# Patient Record
Sex: FEMALE | Race: BLACK OR AFRICAN AMERICAN | Employment: UNEMPLOYED | URBAN - METROPOLITAN AREA
[De-identification: names, ages, dates, MRNs, and addresses within clinical notes are randomized per-mention and may not be internally consistent; named-entity substitution may affect disease eponyms.]

---

## 2018-01-15 PROBLEM — F41.8 DEPRESSION WITH ANXIETY: Status: ACTIVE | Noted: 2018-01-15

## 2018-01-15 PROBLEM — Z82.49 FAMILY HISTORY OF HEART DISEASE: Status: ACTIVE | Noted: 2018-01-15

## 2019-08-28 PROBLEM — M51.36 DDD (DEGENERATIVE DISC DISEASE), LUMBAR: Status: ACTIVE | Noted: 2019-08-28

## 2019-08-28 PROBLEM — K21.9 GERD (GASTROESOPHAGEAL REFLUX DISEASE): Status: ACTIVE | Noted: 2019-08-28

## 2019-08-28 PROBLEM — M48.062 SPINAL STENOSIS OF LUMBAR REGION WITH NEUROGENIC CLAUDICATION: Status: ACTIVE | Noted: 2019-08-28

## 2019-10-18 PROBLEM — E55.9 VITAMIN D DEFICIENCY: Status: ACTIVE | Noted: 2019-01-25

## 2019-11-07 PROBLEM — M48.061 LUMBAR STENOSIS: Status: ACTIVE | Noted: 2019-11-07

## 2020-02-17 PROBLEM — M85.80 OSTEOPENIA: Status: ACTIVE | Noted: 2019-11-06

## 2020-02-17 PROBLEM — K44.9 HIATAL HERNIA: Status: ACTIVE | Noted: 2020-02-17

## 2020-02-17 PROBLEM — E66.9 OBESITY: Status: ACTIVE | Noted: 2020-01-20

## 2020-02-17 PROBLEM — K31.84 GASTROPARESIS: Status: ACTIVE | Noted: 2020-02-17

## 2020-06-29 PROBLEM — R79.89 ELEVATED LIVER FUNCTION TESTS: Status: ACTIVE | Noted: 2020-06-29

## 2021-03-11 PROBLEM — E61.1 IRON DEFICIENCY: Status: ACTIVE | Noted: 2021-03-11

## 2021-03-22 ENCOUNTER — TELEPHONE (OUTPATIENT)
Dept: INTERNAL MEDICINE CLINIC | Age: 56
End: 2021-03-22

## 2021-03-22 NOTE — TELEPHONE ENCOUNTER
Please call patient to triage -- she went to [facility] today after eating an apple/pecan salad and her jaw got tight, then her lymph node swelled on right side. Was told it could be an allergic reaction or a clogged lymph node. Was given prednisone and another med and told to see her PCP. I had already scheduled her with Farnaz Anna tomorrow morning.

## 2021-03-23 NOTE — TELEPHONE ENCOUNTER
Katie Skiff advised me yesterday to just let her see Magi Geo this morning since she had already been seen at [facility].

## 2021-07-15 PROBLEM — G47.00 INSOMNIA, UNSPECIFIED TYPE: Status: ACTIVE | Noted: 2021-07-15

## 2022-03-29 PROBLEM — Z85.3 HISTORY OF RIGHT BREAST CANCER: Status: ACTIVE | Noted: 2021-07-01

## 2022-03-29 PROBLEM — I49.9 IRREGULAR HEART BEAT: Status: ACTIVE | Noted: 2022-01-01

## 2022-03-29 PROBLEM — M48.061 LUMBAR STENOSIS: Status: RESOLVED | Noted: 2019-11-07 | Resolved: 2022-03-29

## 2022-04-06 PROBLEM — R73.9 HYPERGLYCEMIA: Status: ACTIVE | Noted: 2022-04-06

## 2022-04-06 PROBLEM — E78.00 HIGH CHOLESTEROL: Status: ACTIVE | Noted: 2022-04-06

## 2022-05-20 PROBLEM — I50.30 HEART FAILURE WITH PRESERVED LEFT VENTRICULAR FUNCTION (HFPEF) (HCC): Status: ACTIVE | Noted: 2022-05-20

## 2022-06-29 PROBLEM — C50.919 INVASIVE LOBULAR CARCINOMA OF BREAST IN FEMALE (HCC): Status: ACTIVE | Noted: 2022-06-29

## 2022-10-06 PROBLEM — C50.911 BREAST CANCER, RIGHT (HCC): Status: RESOLVED | Noted: 2022-06-29 | Resolved: 2022-10-06

## 2022-10-06 PROBLEM — C50.911 BREAST CANCER, RIGHT (HCC): Status: ACTIVE | Noted: 2022-06-29

## 2022-11-10 ENCOUNTER — CLINICAL SUPPORT (OUTPATIENT)
Dept: SURGERY | Age: 57
End: 2022-11-10

## 2022-11-10 DIAGNOSIS — E66.01 CLASS 2 SEVERE OBESITY WITH BODY MASS INDEX (BMI) OF 35 TO 39.9 WITH SERIOUS COMORBIDITY (HCC): Primary | ICD-10-CM

## 2022-11-10 NOTE — PROGRESS NOTES
[facility]  Metabolic Program Follow-up Nutrition Consult    Date: 11/10/2022   Physician: Francesco Nazario NP  Name: Christopher Marie  :  1965    Type of Plan: LCD  Weeks on Plan: 7 months  Virtual visit was completed through [employer]. ASSESSMENT:    Medications/Supplements:   Prior to Admission medications    Medication Sig Start Date End Date Taking? Authorizing Provider   amoxicillin (AMOXIL) 500 mg capsule TAKE 1 CAPSULE BY MOUTH THREE TIMES DAILY FOR 7 DAYS FOR DENTAL INFECTION 22   Provider, Historical   clindamycin (CLEOCIN) 300 mg capsule TAKE 1 CAPSULE BY MOUTH FOUR TIMES DAILY UNTIL ALL TAKEN 22   Provider, Historical   ergocalciferol (ERGOCALCIFEROL) 1,250 mcg (50,000 unit) capsule TAKE 1 CAPSULE BY MOUTH EVERY 7 DAYS FOR 90 DDAYS 22   Provider, Historical   gabapentin (NEURONTIN) 300 mg capsule  22   Provider, Historical   HYDROcodone-acetaminophen (NORCO) 5-325 mg per tablet Take 1 Tablet by mouth every six (6) hours as needed. Patient not taking: No sig reported 22   Provider, Historical   metFORMIN ER (GLUCOPHAGE XR) 500 mg tablet  22   Provider, Historical   tiZANidine (ZANAFLEX) 2 mg tablet Take 2 mg by mouth as needed. 22   Provider, Historical   CYANOCOBALAMIN, VITAMIN B-12, INJECTION by Injection route every three (3) months. Provider, Historical   cholecalciferol (VITAMIN D3) 25 mcg (1,000 unit) cap Take 1,000 Units by mouth daily. 7/15/22   Provider, Historical   busPIRone (BUSPAR) 5 mg tablet Take 5 mg by mouth two (2) times a day. 22   Provider, Historical   traMADol-acetaminophen (ULTRACET) 37.5-325 mg per tablet 1 Tablet as needed. Takes as needed. 22   Provider, Historical   LORazepam (ATIVAN) 0.5 mg tablet Take 0.5 mg by mouth nightly.  22   Provider, Historical   Insulin Needles, Disposable, (Betty Pen Needle) 32 gauge x \" ndle Use daily as directed 22   Dee GONZALES, DO   pregabalin (LYRICA) 150 mg capsule Take 150 mg by mouth daily. Provider, Historical   cyclobenzaprine (FLEXERIL) 10 mg tablet Take 10 mg by mouth as needed. 6/2/22   Provider, Historical   buPROPion XL (WELLBUTRIN XL) 150 mg tablet Take 1 Tablet by mouth daily. 4/28/22   Naman GONZALES DO   sodium fluoride-pot nitrate 1.1-5 % pste  10/21/21   Provider, Historical   bumetanide (BUMEX) 1 mg tablet Take 1 Tablet by mouth Three (3) times a week. Patient taking differently: Take 1 mg by mouth every Monday, Wednesday, Friday. 4/8/22   Mayra Thomas, STELLA   anastrozole (ARIMIDEX) 1 mg tablet Take 1 mg by mouth daily. Provider, Historical   potassium 99 mg tablet Take 1 Tablet by mouth daily. 9/30/21   Mayra Thomas, NP   nystatin-triamcinolone (MYCOLOG II) topical cream Apply  to affected area two (2) times a day. Provider, Historical   acetaminophen (TYLENOL ARTHRITIS PO) Take  by mouth two (2) times daily as needed. Provider, Historical   cyanocobalamin (VITAMIN B12) 500 mcg tablet Take 500 mcg by mouth every morning. Provider, Historical   sucralfate (CARAFATE) 1 gram tablet Take 1 g by mouth daily as needed. Prn     Provider, Historical   linaclotide (LINZESS) 290 mcg cap capsule Take 290 mcg by mouth daily as needed. Provider, Historical              Starting Weight: 198#   Current Weight: 199# (199# last visit one month ago)  Overall Pounds Lost: 0  Overall Pounds Gained: 1#  Self report weight is 194#    Exercise/Physical Activity:not reported    Is patient using [facility] Directions products:no  If yes, how many per day: none    Aversions/side effects of product/program:N/a    Fluids used to mix with products:n/a    Reported Diet History: only ND product using PRN is the fiber tea in the morning. Has d.c the shakes and bars. Pt is choosing grocery meals and snacks trying to remain within 6496-9646 calories per day.   May have a refined carb snack 1-2 days per week to stay on track overall for the whole week. This helps satisfy those cravings. Water to suppress hunger. Exceeds 60 ounces per day. Fiber tea in the morning from ND.      24 Hour Diet Recall  Breakfast 7am Spoonful of pb OR boiled or scrambled egg, raisin toast, apple sauce   Lunch 12pm Healthy choice frozen meal   Dinner 5-6pm 1 fried pork chop with corn and 1 T red potatoes   Snacks 8:30pm Raisin/walnut/cheese OR sour cream and onion chip small bowl 1-2 x week, OR carb one ice cream 1-2x week OR cottage cheese/peaches   Beverages       Barriers/concerns preventing patient from achieving goal(s) since last visit:cravings PRN    NUTRITION INTERVENTION:  Pt educated on nutrition recommendations for the [facility] Low Calorie Plan (LCD), with the specific meal pattern of 2 meal replacements every day plus a grocery meal and snack. Daily recommended totals: 1200 calories, 60 grams carbs, 80+ grams protein, and remaining calories as healthy fats. Use LCD handout for meal and snack suggestions and preparation. Grocery meal:  Use the balanced plate method to plan meals, include 3-6 oz of lean source of protein, unlimited non-starchy vegetables, 1/2 cup whole grains/beans OR 1/2 cup fruit OR 1 serving of low fat dairy. Utilize handouts listing healthy snack and meal ideas. Read all nutrition labels. Demonstrated and emphasized identifying serving size, total fat, sugar and protein content. Defined low fat as </= 3 g per serving. Discussed lean and extra lean sources of protein. Avoid foods with sugar listed in the first 3 ingredients and >/10 g sugar per serving. Consume meal replacements every three to four hours or pattern as discussed with provider. Mix with water. May add herbs/spices for taste. Practice mindful eating habits; take small bites, chew thoroughly, avoid distractions, utilize hunger/fullness scale. Reviewed attendance policy of attending weekly nutrition classes.   Metabolic support group recommended, and increase physical activity (approved per MD) for long term weight maintenance. NUTRITION MONITORING AND EVALUATION: pt no longer doing [facility] LCD plan, all grocery meals moving forward. Discussed basic nutrition guidelines, balanced plate, hydration, and increased movement. Followup PRN, but moving forward will be a [facility] patient. She plans to discuss program at next visit to decide how to proceed. Specific tips and techniques to facilitate compliance with above recommendations were provided and discussed. If further details are desired please contact me at [phone]. This phone number was also provided to the patient for any further questions or concerns.           Sylvie Rivera, MS, RD, LDN

## 2022-11-22 ENCOUNTER — OFFICE VISIT (OUTPATIENT)
Dept: SURGERY | Age: 57
End: 2022-11-22
Payer: MEDICARE

## 2022-11-22 VITALS
RESPIRATION RATE: 18 BRPM | SYSTOLIC BLOOD PRESSURE: 111 MMHG | OXYGEN SATURATION: 96 % | WEIGHT: 194.8 LBS | BODY MASS INDEX: 34.52 KG/M2 | TEMPERATURE: 98.1 F | HEART RATE: 65 BPM | DIASTOLIC BLOOD PRESSURE: 75 MMHG | HEIGHT: 63 IN

## 2022-11-22 DIAGNOSIS — Z99.89 OSA ON CPAP: ICD-10-CM

## 2022-11-22 DIAGNOSIS — E78.00 HIGH CHOLESTEROL: ICD-10-CM

## 2022-11-22 DIAGNOSIS — G47.33 OSA ON CPAP: ICD-10-CM

## 2022-11-22 DIAGNOSIS — E66.09 CLASS 1 OBESITY DUE TO EXCESS CALORIES WITH SERIOUS COMORBIDITY IN ADULT, UNSPECIFIED BMI: Primary | ICD-10-CM

## 2022-11-22 DIAGNOSIS — R73.9 HYPERGLYCEMIA: ICD-10-CM

## 2022-11-22 PROCEDURE — G9899 SCRN MAM PERF RSLTS DOC: HCPCS | Performed by: FAMILY MEDICINE

## 2022-11-22 PROCEDURE — G8754 DIAS BP LESS 90: HCPCS | Performed by: FAMILY MEDICINE

## 2022-11-22 PROCEDURE — G9717 DOC PT DX DEP/BP F/U NT REQ: HCPCS | Performed by: FAMILY MEDICINE

## 2022-11-22 PROCEDURE — G8427 DOCREV CUR MEDS BY ELIG CLIN: HCPCS | Performed by: FAMILY MEDICINE

## 2022-11-22 PROCEDURE — 99214 OFFICE O/P EST MOD 30 MIN: CPT | Performed by: FAMILY MEDICINE

## 2022-11-22 PROCEDURE — G8417 CALC BMI ABV UP PARAM F/U: HCPCS | Performed by: FAMILY MEDICINE

## 2022-11-22 PROCEDURE — 3017F COLORECTAL CA SCREEN DOC REV: CPT | Performed by: FAMILY MEDICINE

## 2022-11-22 PROCEDURE — G8752 SYS BP LESS 140: HCPCS | Performed by: FAMILY MEDICINE

## 2022-11-22 RX ORDER — SODIUM FLUORIDE 5 MG/ML
PASTE, DENTIFRICE DENTAL
COMMUNITY
Start: 2022-09-29

## 2022-11-22 NOTE — PROGRESS NOTES
[facility] Progress Note:   F/up Physician Visit    CC: Weight Management      Cheryl Will is a 61 y.o. female who is here for her  f/up physician visit for the LCD Program.  Post GBP  Lowest weight was 140  Now 194  Goal 170   Oct was 199    Taking wellbutrin xl 150 mg a day  She thinks the wellbutin stimultes hunger   She has an egg after the meal replacement  She has discussed this with the dietitian    Weight Metrics 11/22/2022 11/22/2022 10/6/2022 9/13/2022 9/13/2022 8/9/2022 8/9/2022   Weight - 194 lb 12.8 oz 199 lb - 199 lb 9.6 oz - 203 lb 3.2 oz   Neck Circ (inches) 12 - - 12.5 - 12.5 -   Waist Measure Inches 34.25 - - 36 - 36.5 -   Body Fat % 41 - - 41.7 - 42 -   BMI - 34.51 kg/m2 35.25 kg/m2 - 35.36 kg/m2 - 36 kg/m2         Outpatient Medications Marked as Taking for the 11/22/22 encounter (Office Visit) with Rose Mesa MD   Medication Sig Dispense Refill    gabapentin (NEURONTIN) 300 mg capsule Take 300 mg by mouth as needed. Participation   Did you attend clinic and class last week? no    Review of Systems  Since your last visit, have you experienced any complications? no  If yes, please list:       Are you taking an appetite suppressant? yes  If so, is there any Chest Pain, Palpitations or Dizziness? HUNGER CONTROL: fair    BP Readings from Last 3 Encounters:   11/22/22 111/75   10/06/22 110/71   09/13/22 113/79       SLEEP:6-7    Have you received any other medical care this week? no  If yes, where and for what? Have you discontinued or changed any medicine or dose of your medicine since your last visit with Dr Chao Gillis? no  If yes, where and for what?          Diet  How many ounces of calorie-free liquids did you consume each day?  57 oz    How many meal replacements did you take each day? 2 and a meal and a few snacks    Did you have any problems adhering to the program?  no If yes, please explain:      Exercise  Aerobic exercise: 45 min  4 days  Resistance exercise: at the gym on alternating days workouts / week  Any discomfort?  no     If yes, where? Objective  Visit Vitals  /75 (BP 1 Location: Right arm, BP Patient Position: Sitting, BP Cuff Size: Adult)   Pulse 65   Temp 98.1 °F (36.7 °C) (Oral)   Resp 18   Ht 5' 3\" (1.6 m)   Wt 194 lb 12.8 oz (88.4 kg)   SpO2 96%   BMI 34.51 kg/m²     No LMP recorded. Patient has had a hysterectomy. Physical Exam  Appearance: well,   Mental:A&O x 3, NAD  H:NC/AT,  EENT:   EOMI, PERRL, No scleral icterus  Neck: no bruit or JVD  Lung: clear, No W/R  ABD: soft, active, nontender  Ext:  no Edema  Neuro: nonfocal  Assessment / Plan    Encounter Diagnoses   Name Primary? Class 1 obesity due to excess calories with serious comorbidity in adult, unspecified BMI Yes    KEVIN on CPAP     Hyperglycemia     High cholesterol      Diagnoses and all orders for this visit:    1. Class 1 obesity due to excess calories with serious comorbidity in adult, unspecified BMI  Cont the LCD  Doing well with exercise  Sleeping pretty good too  Doing well  2. KEVIN on CPAP  Sleeping well  3. Hyperglycemia  Waiting for her toget labs drawn, orders are waiting  4. High cholesterol  Orders in system waiting for her to go to lab  1. Weight management improved   lost 5 lbs over the past month  Doing well     Progress was reviewed with patient    2. Labs    Latest results reviewed with patient       face to face time with Christen Munguia consisted of counseling & coordinating and/or discussing treatment plans in reference to her obesity The primary encounter diagnosis was Class 1 obesity due to excess calories with serious comorbidity in adult, unspecified BMI. Diagnoses of KEVIN on CPAP, Hyperglycemia, and High cholesterol were also pertinent to this visit.

## 2022-11-22 NOTE — PROGRESS NOTES
Weight Management. 1 month follow up. 1. Have you been to the ER, urgent care clinic since your last visit? Hospitalized since your last visit? No    2. Have you seen or consulted any other health care providers outside of the [address] [place] [address] since your last visit? Include any pap smears or colon screening.  Yes, [facility].    BMI - 34.1

## 2022-11-29 ENCOUNTER — NURSE TRIAGE (OUTPATIENT)
Dept: OTHER | Facility: CLINIC | Age: 57
End: 2022-11-29

## 2022-11-29 NOTE — TELEPHONE ENCOUNTER
[facility]    Received call from [facility] with [employer] Complaint. Subjective: Caller states \"my OBGYN started me on Metformin and I am having a lot of fatigue and cramping in my legs\"     Current Symptoms: fatigue, leg cramps    Onset: 2-3 weeks    Pain Severity: denies    Temperature: denies     What has been tried: NA    Patient did discuss this with her \"weight loss doctor\" and was told to follow up with PCP. No triage needed.

## 2022-12-02 ENCOUNTER — CLINICAL SUPPORT (OUTPATIENT)
Dept: SURGERY | Age: 57
End: 2022-12-02

## 2022-12-02 DIAGNOSIS — E66.09 CLASS 1 OBESITY DUE TO EXCESS CALORIES WITH SERIOUS COMORBIDITY IN ADULT, UNSPECIFIED BMI: Primary | ICD-10-CM

## 2022-12-02 LAB — HBA1C MFR BLD HPLC: 5.7 %

## 2022-12-02 NOTE — PROGRESS NOTES
[facility]  Metabolic Program Follow-up Nutrition Consult    Date: 2022   Physician: Nga Yan MD  Name: Buddy Boyd  :  1965    Type of Plan: LCD  Weeks on Plan: 8 months  Virtual visit was completed through [address] [place] [address]. ASSESSMENT:    Medications/Supplements:   Prior to Admission medications    Medication Sig Start Date End Date Taking? Authorizing Provider   PreviDent 5000 Plus 1.1 % crea BRUSH 2 MINUTES WITH PEASIZED TOOTHPASTE AT BEDTIME AFTER REGULAR BRUSHING THEN SPIT OUT. NOT FOOD OR DRINK AFTERWARD 22   Provider, Historical   ergocalciferol (ERGOCALCIFEROL) 1,250 mcg (50,000 unit) capsule TAKE 1 CAPSULE BY MOUTH EVERY 7 DAYS FOR 90 DDAYS 22   Provider, Historical   gabapentin (NEURONTIN) 300 mg capsule Take 300 mg by mouth as needed. 22   Provider, Historical   metFORMIN ER (GLUCOPHAGE XR) 500 mg tablet Take 500 mg by mouth daily (with dinner). 22   Provider, Historical   CYANOCOBALAMIN, VITAMIN B-12, INJECTION by Injection route every three (3) months. Provider, Historical   cholecalciferol (VITAMIN D3) 25 mcg (1,000 unit) cap Take 1,000 Units by mouth daily. 7/15/22   Provider, Historical   pregabalin (LYRICA) 150 mg capsule Take 150 mg by mouth daily. Provider, Historical   buPROPion XL (WELLBUTRIN XL) 150 mg tablet Take 1 Tablet by mouth daily. 22   Corlis Hearing R, DO   sodium fluoride-pot nitrate 1.1-5 % pste  10/21/21   Provider, Historical   bumetanide (BUMEX) 1 mg tablet Take 1 Tablet by mouth Three (3) times a week. Patient taking differently: Take 1 mg by mouth every Monday, Wednesday, Friday. 22   Gogo Thomas NP   anastrozole (ARIMIDEX) 1 mg tablet Take 1 mg by mouth daily. Provider, Historical   potassium 99 mg tablet Take 1 Tablet by mouth daily. 21   Gogo Thomas NP   acetaminophen (TYLENOL ARTHRITIS PO) Take  by mouth two (2) times daily as needed.     Provider, Historical cyanocobalamin (VITAMIN B12) 500 mcg tablet Take 500 mcg by mouth every morning. Provider, Historical   sucralfate (CARAFATE) 1 gram tablet Take 1 g by mouth daily as needed. Prn     Provider, Historical   linaclotide (LINZESS) 290 mcg cap capsule Take 290 mcg by mouth daily as needed. Provider, Historical              Starting Weight: 198#   Current Weight: 194# (199# last visit one month ago)  Overall Pounds Lost: 4#  Overall Pounds Gained: 0    Exercise/Physical Activity: moved into house, higher ADLs, structured activity not mentioned. Is patient using New Directions products:no  If yes, how many per day: 0    Aversions/side effects of product/program: n/a    Fluids used to mix with products:n/a    Reported Diet History: cutting back on portions at all meals. Few sweets since last visit, except the holiday one slice of cake each night for two nights. Sporadic meal patterns continue at times, for example, did not eat until 2pm yesterday because out all morning on errands and appointments. Typical day:  Breakfasts: 1 spoonful pb, oatmeal, and banana  Lunches: sandwich or leftovers  Dinners: protein such as ham, turkey, or fish. Will have 2 corn on cobs most nights. Snacks: handful chips, walnuts and raisins, or ND drink (rarely)  Beverages: water and tea      24 Hour Diet Recall  Breakfast  skipped   Lunch 2pm Chicken sandwich, tomato basil soup   Dinner  2 slices of pizza   Snacks  none   Beverages  water     Barriers/concerns preventing patient from achieving goal(s) since last visit:none reported    NUTRITION INTERVENTION:  Pt educated on nutrition recommendations for the [facility] Low Calorie Plan (LCD), with the specific meal pattern of 2 meal replacements every day plus a grocery meal and snack. Daily recommended totals: 1200 calories, 60 grams carbs, 80+ grams protein, and remaining calories as healthy fats. Use LCD handout for meal and snack suggestions and preparation.     Grocery meal:  Use the balanced plate method to plan meals, include 3-6 oz of lean source of protein, unlimited non-starchy vegetables, 1/2 cup whole grains/beans OR 1/2 cup fruit OR 1 serving of low fat dairy. Utilize handouts listing healthy snack and meal ideas. Read all nutrition labels. Demonstrated and emphasized identifying serving size, total fat, sugar and protein content. Defined low fat as </= 3 g per serving. Discussed lean and extra lean sources of protein. Avoid foods with sugar listed in the first 3 ingredients and >/10 g sugar per serving. Consume meal replacements every three to four hours or pattern as discussed with provider. Mix with water. May add herbs/spices for taste. Practice mindful eating habits; take small bites, chew thoroughly, avoid distractions, utilize hunger/fullness scale. Reviewed attendance policy of attending weekly nutrition classes. Metabolic support group recommended, and increase physical activity (approved per MD) for long term weight maintenance. NUTRITION MONITORING AND EVALUATION: 5# loss x 30 days, but an overall loss of 4# x 9 months. Pt is not doing the [facility] program.  Discussed carb intake is a tad high to accommodate weight loss pattern she desires. Limit starches on plate to 2 days per week only and cut that in half as well. Refrain from having 2 corn on the cobs many nights per week. Measure high calorie snacks such as walnuts (1 ounce or golf ball size). No skipping meals all day. Try to have something every 3-4 hours. Use ND shakes in place of skipping. Refer to LCD handout as needed for calorie and macros of meals and snacks. Pt motivated, adherence likely, followup one month. Specific tips and techniques to facilitate compliance with above recommendations were provided and discussed. If further details are desired please contact me at [phone].   This phone number was also provided to the patient for any further questions or concerns.           Yg Musa, MS, RD, LDN

## 2022-12-07 ENCOUNTER — TELEPHONE (OUTPATIENT)
Dept: CARDIOLOGY CLINIC | Age: 57
End: 2022-12-07

## 2022-12-07 NOTE — TELEPHONE ENCOUNTER
KHARIM requesting a return call to reschedule 1/16/2023 appointment with Shey Parish NP to Next Available. Provider scheduled off.

## 2022-12-13 ENCOUNTER — OFFICE VISIT (OUTPATIENT)
Dept: INTERNAL MEDICINE CLINIC | Age: 57
End: 2022-12-13
Payer: MEDICARE

## 2022-12-13 VITALS
OXYGEN SATURATION: 94 % | DIASTOLIC BLOOD PRESSURE: 76 MMHG | BODY MASS INDEX: 35.44 KG/M2 | HEART RATE: 60 BPM | TEMPERATURE: 98.5 F | WEIGHT: 200 LBS | RESPIRATION RATE: 12 BRPM | HEIGHT: 63 IN | SYSTOLIC BLOOD PRESSURE: 130 MMHG

## 2022-12-13 DIAGNOSIS — E53.8 B12 DEFICIENCY: ICD-10-CM

## 2022-12-13 DIAGNOSIS — E88.81 INSULIN RESISTANCE: ICD-10-CM

## 2022-12-13 DIAGNOSIS — T14.8XXA BRUISING: Primary | ICD-10-CM

## 2022-12-13 DIAGNOSIS — E55.9 VITAMIN D DEFICIENCY: ICD-10-CM

## 2022-12-13 PROCEDURE — G8427 DOCREV CUR MEDS BY ELIG CLIN: HCPCS | Performed by: INTERNAL MEDICINE

## 2022-12-13 PROCEDURE — 99213 OFFICE O/P EST LOW 20 MIN: CPT | Performed by: INTERNAL MEDICINE

## 2022-12-13 PROCEDURE — G9717 DOC PT DX DEP/BP F/U NT REQ: HCPCS | Performed by: INTERNAL MEDICINE

## 2022-12-13 PROCEDURE — G8754 DIAS BP LESS 90: HCPCS | Performed by: INTERNAL MEDICINE

## 2022-12-13 PROCEDURE — 3017F COLORECTAL CA SCREEN DOC REV: CPT | Performed by: INTERNAL MEDICINE

## 2022-12-13 PROCEDURE — G8417 CALC BMI ABV UP PARAM F/U: HCPCS | Performed by: INTERNAL MEDICINE

## 2022-12-13 PROCEDURE — G8752 SYS BP LESS 140: HCPCS | Performed by: INTERNAL MEDICINE

## 2022-12-13 PROCEDURE — G9899 SCRN MAM PERF RSLTS DOC: HCPCS | Performed by: INTERNAL MEDICINE

## 2022-12-13 NOTE — PROGRESS NOTES
ADVISED PATIENT OF THE FOLLOWING HEALTH MAINTAINCE DUE  Health Maintenance Due   Topic Date Due    Pneumococcal 0-64 years (1 - PCV) Never done    Shingrix Vaccine Age 50> (1 of 2) Never done    COVID-19 Vaccine (2 - Pfizer series) 11/12/2021    Flu Vaccine (1) Never done      Chief Complaint   Patient presents with    Fatigue    Abdominal Pain     Internal itching for over 10 months off and on. When Pt was Dx with breast cancer she had the same internal itch in her breast so she's just concerned. Leg Pain     Left leg. Pt states she been taking potassium. Bleeding/Bruising       1. \"Have you been to the ER, urgent care clinic since your last visit? Hospitalized since your last visit? \" No    2. \"Have you seen or consulted any other health care providers outside of the [address] [place] [address] since your last visit? \" No     3. For patients aged 39-70: Has the patient had a colonoscopy / FIT/ Cologuard? Yes - Care Gap present. Most recent result on file      If the patient is female:    4. For patients aged 41-77: Has the patient had a mammogram within the past 2 years? Yes - Care Gap present. Most recent result on file      5. For patients aged 21-65: Has the patient had a pap smear? Yes - Care Gap present.  Most recent result on file
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
## 2022-12-15 LAB
25(OH)D3+25(OH)D2 SERPL-MCNC: 40.1 NG/ML (ref 30–100)
ALBUMIN SERPL-MCNC: 4 G/DL (ref 3.8–4.9)
ALBUMIN/GLOB SERPL: 1.4 {RATIO} (ref 1.2–2.2)
ALP SERPL-CCNC: 121 IU/L (ref 44–121)
ALT SERPL-CCNC: 19 IU/L (ref 0–32)
AST SERPL-CCNC: 21 IU/L (ref 0–40)
BASOPHILS # BLD AUTO: 0.1 X10E3/UL (ref 0–0.2)
BASOPHILS NFR BLD AUTO: 1 %
BILIRUB SERPL-MCNC: 0.3 MG/DL (ref 0–1.2)
BUN SERPL-MCNC: 17 MG/DL (ref 6–24)
BUN/CREAT SERPL: 20 (ref 9–23)
CALCIUM SERPL-MCNC: 9.9 MG/DL (ref 8.7–10.2)
CHLORIDE SERPL-SCNC: 106 MMOL/L (ref 96–106)
CO2 SERPL-SCNC: 24 MMOL/L (ref 20–29)
CREAT SERPL-MCNC: 0.85 MG/DL (ref 0.57–1)
EGFR: 79 ML/MIN/1.73
EOSINOPHIL # BLD AUTO: 0.1 X10E3/UL (ref 0–0.4)
EOSINOPHIL NFR BLD AUTO: 2 %
ERYTHROCYTE [DISTWIDTH] IN BLOOD BY AUTOMATED COUNT: 12.9 % (ref 11.7–15.4)
GLOBULIN SER CALC-MCNC: 2.9 G/DL (ref 1.5–4.5)
GLUCOSE SERPL-MCNC: 82 MG/DL (ref 70–99)
HCT VFR BLD AUTO: 41.1 % (ref 34–46.6)
HGB BLD-MCNC: 13.3 G/DL (ref 11.1–15.9)
IMM GRANULOCYTES # BLD AUTO: 0 X10E3/UL (ref 0–0.1)
IMM GRANULOCYTES NFR BLD AUTO: 0 %
INR PPP: 1 (ref 0.9–1.2)
LYMPHOCYTES # BLD AUTO: 1.2 X10E3/UL (ref 0.7–3.1)
LYMPHOCYTES NFR BLD AUTO: 33 %
MCH RBC QN AUTO: 29.4 PG (ref 26.6–33)
MCHC RBC AUTO-ENTMCNC: 32.4 G/DL (ref 31.5–35.7)
MCV RBC AUTO: 91 FL (ref 79–97)
MONOCYTES # BLD AUTO: 0.3 X10E3/UL (ref 0.1–0.9)
MONOCYTES NFR BLD AUTO: 7 %
NEUTROPHILS # BLD AUTO: 2.1 X10E3/UL (ref 1.4–7)
NEUTROPHILS NFR BLD AUTO: 57 %
PLATELET # BLD AUTO: 370 X10E3/UL (ref 150–450)
POTASSIUM SERPL-SCNC: 4.6 MMOL/L (ref 3.5–5.2)
PROT SERPL-MCNC: 6.9 G/DL (ref 6–8.5)
PROTHROMBIN TIME: 10.4 SEC (ref 9.1–12)
RBC # BLD AUTO: 4.53 X10E6/UL (ref 3.77–5.28)
SODIUM SERPL-SCNC: 144 MMOL/L (ref 134–144)
VIT B12 SERPL-MCNC: 765 PG/ML (ref 232–1245)
WBC # BLD AUTO: 3.7 X10E3/UL (ref 3.4–10.8)

## 2023-01-04 ENCOUNTER — CLINICAL SUPPORT (OUTPATIENT)
Dept: SURGERY | Age: 58
End: 2023-01-04

## 2023-01-04 DIAGNOSIS — E66.09 CLASS 1 OBESITY DUE TO EXCESS CALORIES WITH SERIOUS COMORBIDITY IN ADULT, UNSPECIFIED BMI: Primary | ICD-10-CM

## 2023-01-04 NOTE — PROGRESS NOTES
[facility]  Metabolic Program Follow-up Nutrition Consult    Date: 2023   Physician: Darron Wilson MD     Name: Wilber Avalos  :  1965    Type of Plan: LCD   Weeks on Plan: 9 months  Virtual visit was completed through [address] [place] [address]. ASSESSMENT:    Medications/Supplements:   Prior to Admission medications    Medication Sig Start Date End Date Taking? Authorizing Provider   PreviDent 5000 Plus 1.1 % crea BRUSH 2 MINUTES WITH PEASIZED TOOTHPASTE AT BEDTIME AFTER REGULAR BRUSHING THEN SPIT OUT. NOT FOOD OR DRINK AFTERWARD 22   Provider, Historical   ergocalciferol (ERGOCALCIFEROL) 1,250 mcg (50,000 unit) capsule TAKE 1 CAPSULE BY MOUTH EVERY 7 DAYS FOR 90 DDAYS 22   Provider, Historical   gabapentin (NEURONTIN) 300 mg capsule Take 300 mg by mouth as needed. 22   Provider, Historical   metFORMIN ER (GLUCOPHAGE XR) 500 mg tablet Take 500 mg by mouth daily (with dinner). 22   Provider, Historical   CYANOCOBALAMIN, VITAMIN B-12, INJECTION by Injection route every three (3) months. Provider, Historical   cholecalciferol (VITAMIN D3) 25 mcg (1,000 unit) cap Take 1,000 Units by mouth daily. 7/15/22   Provider, Historical   pregabalin (LYRICA) 150 mg capsule Take 150 mg by mouth daily. Provider, Historical   buPROPion XL (WELLBUTRIN XL) 150 mg tablet Take 1 Tablet by mouth daily. 22   Neyda Adam R, DO   sodium fluoride-pot nitrate 1.1-5 % pste  10/21/21   Provider, Historical   bumetanide (BUMEX) 1 mg tablet Take 1 Tablet by mouth Three (3) times a week. Patient taking differently: Take 1 mg by mouth every Monday, Wednesday, Friday. 22   Gian Thomas NP   anastrozole (ARIMIDEX) 1 mg tablet Take 1 mg by mouth daily. Provider, Historical   potassium 99 mg tablet Take 1 Tablet by mouth daily. 21   Gian Thomas NP   acetaminophen (TYLENOL ARTHRITIS PO) Take  by mouth two (2) times daily as needed.     Provider, Historical   cyanocobalamin (VITAMIN B12) 500 mcg tablet Take 500 mcg by mouth every morning. Provider, Historical   sucralfate (CARAFATE) 1 gram tablet Take 1 g by mouth daily as needed. Prn     Provider, Historical   linaclotide (LINZESS) 290 mcg cap capsule Take 290 mcg by mouth daily as needed. Provider, Historical              Starting Weight: 198#   Current Weight: 194# (194# last visit one month ago, no new weight on chart from office). Self report of 3# loss. Overall Pounds Lost: 4#  Overall Pounds Gained: 0    Exercise/Physical Activity: Gym most days of the week. 30-40 minutes treadmill, 2 min on stepper, 10 min on bike. Is patient using [employer] products:yes  If yes, how many per day: 0-1    Aversions/side effects of product/program:none    Fluids used to mix with products:water    Reported Diet History:  No skipping, feeding self when hungry, doesn't feel deprived or starving. Breakfast: ND product or carnation breakfast essentials low sugar alternating days. Lunch: healthy choice  Snack: pudding ND product 1/2 for a sweet treat, raisins with cashews, trail mix, prunes, carb balance ice cream, handful of sour cream and onion potato chips, or walnut, dates, and oatmeal.2 days a week and string beans. Fried chicken rarely. Beverages: water only    Barriers/concerns preventing patient from achieving goal(s) since last visit: none    NUTRITION INTERVENTION:  Pt educated on nutrition recommendations for the [employer] Low Calorie Plan (LCD), with the specific meal pattern of 2 meal replacements every day plus a grocery meal and snack. Daily recommended totals: 1200 calories, 60 grams carbs, 80+ grams protein, and remaining calories as healthy fats. Use LCD handout for meal and snack suggestions and preparation.     Grocery meal:  Use the balanced plate method to plan meals, include 3-6 oz of lean source of protein, unlimited non-starchy vegetables, 1/2 cup whole grains/beans OR 1/2 cup fruit OR 1 serving of low fat dairy. Utilize handouts listing healthy snack and meal ideas. Read all nutrition labels. Demonstrated and emphasized identifying serving size, total fat, sugar and protein content. Defined low fat as </= 3 g per serving. Discussed lean and extra lean sources of protein. Avoid foods with sugar listed in the first 3 ingredients and >/10 g sugar per serving. Consume meal replacements every three to four hours or pattern as discussed with provider. Mix with water. May add herbs/spices for taste. Practice mindful eating habits; take small bites, chew thoroughly, avoid distractions, utilize hunger/fullness scale. Reviewed attendance policy of attending weekly nutrition classes. Metabolic support group recommended, and increase physical activity (approved per MD) for long term weight maintenance. NUTRITION MONITORING AND EVALUATION: no new weight from [facility] office since our last visit but self report weight loss of 3# over holidays. Pt motivated, hunger controlled, and doesn't feel triggered or tempted to binge when incorporating her favorites a few days a week to stay on track overall (corn and potato chips). Calories some days are exceeding that needed for weight loss, but closer to maintaining current weight. She chooses calorie dense snacks such as nuts/trail mix. Although they are healthy fats, monitor portions for calorie deficit to promote weight loss. Great job on hydration, movement, and routine meal patterns. No nutritional changes at this time. Adherence likely, followup one month. Specific tips and techniques to facilitate compliance with above recommendations were provided and discussed. If further details are desired please contact me at [phone]. This phone number was also provided to the patient for any further questions or concerns.           Vlad Zamudio, MS, RD, LDN

## 2023-01-17 ENCOUNTER — OFFICE VISIT (OUTPATIENT)
Dept: SURGERY | Age: 58
End: 2023-01-17
Payer: MEDICARE

## 2023-01-17 VITALS
HEART RATE: 65 BPM | OXYGEN SATURATION: 99 % | BODY MASS INDEX: 35.38 KG/M2 | DIASTOLIC BLOOD PRESSURE: 74 MMHG | SYSTOLIC BLOOD PRESSURE: 116 MMHG | WEIGHT: 199.7 LBS | TEMPERATURE: 97.3 F | HEIGHT: 63 IN

## 2023-01-17 DIAGNOSIS — G47.33 OSA ON CPAP: ICD-10-CM

## 2023-01-17 DIAGNOSIS — G47.00 INSOMNIA, UNSPECIFIED TYPE: ICD-10-CM

## 2023-01-17 DIAGNOSIS — Z99.89 OSA ON CPAP: ICD-10-CM

## 2023-01-17 DIAGNOSIS — E88.81 INSULIN RESISTANCE: ICD-10-CM

## 2023-01-17 DIAGNOSIS — R73.9 HYPERGLYCEMIA: ICD-10-CM

## 2023-01-17 DIAGNOSIS — E66.01 CLASS 2 SEVERE OBESITY WITH BODY MASS INDEX (BMI) OF 35 TO 39.9 WITH SERIOUS COMORBIDITY (HCC): Primary | ICD-10-CM

## 2023-01-17 PROBLEM — E11.9 TYPE 2 DIABETES MELLITUS (HCC): Status: ACTIVE | Noted: 2023-01-17

## 2023-01-17 PROCEDURE — G8427 DOCREV CUR MEDS BY ELIG CLIN: HCPCS | Performed by: FAMILY MEDICINE

## 2023-01-17 PROCEDURE — 99214 OFFICE O/P EST MOD 30 MIN: CPT | Performed by: FAMILY MEDICINE

## 2023-01-17 PROCEDURE — 3017F COLORECTAL CA SCREEN DOC REV: CPT | Performed by: FAMILY MEDICINE

## 2023-01-17 PROCEDURE — G9717 DOC PT DX DEP/BP F/U NT REQ: HCPCS | Performed by: FAMILY MEDICINE

## 2023-01-17 PROCEDURE — G8417 CALC BMI ABV UP PARAM F/U: HCPCS | Performed by: FAMILY MEDICINE

## 2023-01-17 RX ORDER — SEMAGLUTIDE 1.34 MG/ML
0.25 INJECTION, SOLUTION SUBCUTANEOUS
Qty: 1 BOX | Refills: 1 | Status: SHIPPED | OUTPATIENT
Start: 2023-01-17

## 2023-01-17 NOTE — PROGRESS NOTES
[facility] Progress Note:   F/up Physician Visit    CC: Weight Management      Jose Wang is a 61 y.o. female who is here for her  f/up physician visit for the [facility] Program.  Post GBP  Lowest weight was 140  Now 194  Goal 170   Oct was 199  Nov 194  and now 199 again  She has been drinking gatoraid because she feels lightheaded after exercise        Weight Metrics 1/17/2023 1/17/2023 12/13/2022 11/22/2022 11/22/2022 10/6/2022 9/13/2022   Weight - 199 lb 11.2 oz 200 lb - 194 lb 12.8 oz 199 lb -   Neck Circ (inches) 11 - - 12 - - 12.5   Waist Measure Inches 36.5 - - 34.25 - - 36   Body Fat % 41.7 - - 41 - - 41.7   BMI - 35.38 kg/m2 35.43 kg/m2 - 34.51 kg/m2 35.25 kg/m2 -         Outpatient Medications Marked as Taking for the 1/17/23 encounter (Office Visit) with Wendy Oropeza MD   Medication Sig Dispense Refill    semaglutide (Ozempic) 0.25 mg or 0.5 mg/dose (2 mg/1.5 ml) subq pen 0.25 mg by SubCUTAneous route every seven (7) days. 1 Box 1    PreviDent 5000 Plus 1.1 % crea BRUSH 2 MINUTES WITH PEASIZED TOOTHPASTE AT BEDTIME AFTER REGULAR BRUSHING THEN SPIT OUT. NOT FOOD OR DRINK AFTERWARD      ergocalciferol (ERGOCALCIFEROL) 1,250 mcg (50,000 unit) capsule TAKE 1 CAPSULE BY MOUTH EVERY 7 DAYS FOR 90 DDAYS      gabapentin (NEURONTIN) 300 mg capsule Take 300 mg by mouth as needed. metFORMIN ER (GLUCOPHAGE XR) 500 mg tablet Take 500 mg by mouth daily (with dinner). Repots takes as needed      cholecalciferol (VITAMIN D3) 25 mcg (1,000 unit) cap Take 1,000 Units by mouth daily. pregabalin (LYRICA) 150 mg capsule Take 150 mg by mouth daily. As needed      buPROPion XL (WELLBUTRIN XL) 150 mg tablet Take 1 Tablet by mouth daily. 90 Tablet 0    sodium fluoride-pot nitrate 1.1-5 % pste       bumetanide (BUMEX) 1 mg tablet Take 1 Tablet by mouth Three (3) times a week.  (Patient taking differently: Take 1 mg by mouth every Monday, Wednesday, Friday.) 90 Tablet 0    anastrozole (ARIMIDEX) 1 mg tablet Take 1 mg by mouth daily. potassium 99 mg tablet Take 1 Tablet by mouth daily. 90 Tablet 0    acetaminophen (TYLENOL ARTHRITIS PO) Take  by mouth two (2) times daily as needed. cyanocobalamin (VITAMIN B12) 500 mcg tablet Take 500 mcg by mouth every morning. sucralfate (CARAFATE) 1 gram tablet Take 1 g by mouth daily as needed. Prn       linaclotide (LINZESS) 290 mcg cap capsule Take 290 mcg by mouth daily as needed. Participation   Did you attend clinic and class last week? no    Review of Systems  Since your last visit, have you experienced any complications? no  If yes, please list:       Are you taking an appetite suppressant? no  If so, is there any Chest Pain, Palpitations or Dizziness? HUNGER CONTROL: fair    BP Readings from Last 3 Encounters:   01/17/23 116/74   12/13/22 130/76   11/22/22 111/75       SLEEP:6-8 hours, takes a sleep aid most nights  Tylenol pm or lyrica  Occassional lorazepam  Metformin also makes her sleep     Have you received any other medical care this week? no  If yes, where and for what? Have you discontinued or changed any medicine or dose of your medicine since your last visit with Dr Ana Cristina Patten? no  If yes, where and for what? Diet  How many ounces of calorie-free liquids did you consume each day? 64 oz    How many meal replacements did you take each day? 1 of ours and 2 carnation lite breakfast a day    Did you have any problems adhering to the program?  no If yes, please explain:      Exercise  Aerobic exercise: 30 min 3 days a week  Resistance exercise: 3 workouts / week  Any discomfort?  no     If yes, where? Objective  Visit Vitals  /74 (BP 1 Location: Left upper arm, BP Patient Position: Sitting, BP Cuff Size: Adult)   Pulse 65   Temp 97.3 °F (36.3 °C) (Oral)   Ht 5' 3\" (1.6 m)   Wt 199 lb 11.2 oz (90.6 kg)   SpO2 99%   BMI 35.38 kg/m²     No LMP recorded. Patient has had a hysterectomy.       Physical Exam  Appearance: well,   Mental:A&O x 3, NAD  H:NC/AT,  EENT:   EOMI, PERRL, No scleral icterus  Neck: no bruit or JVD  Lung: clear, No W/R  ABD: soft, active, nontender  Ext:  no Edema  Neuro: nonfocal  Assessment / Plan    Encounter Diagnoses   Name Primary? Class 2 severe obesity with body mass index (BMI) of 35 to 39.9 with serious comorbidity (HCC) Yes    Hyperglycemia     KEVIN on CPAP     Insomnia, unspecified type     Insulin resistance      Diagnoses and all orders for this visit:    1. Class 2 severe obesity with body mass index (BMI) of 35 to 39.9 with serious comorbidity ([facility] [address]    2. Hyperglycemia  -     semaglutide (Ozempic) 0.25 mg or 0.5 mg/dose (2 mg/1.5 ml) subq pen; 0.25 mg by SubCUTAneous route every seven (7) days. 3. KEVIN on CPAP    4. Insomnia, unspecified type    5. Insulin resistance  -     semaglutide (Ozempic) 0.25 mg or 0.5 mg/dose (2 mg/1.5 ml) subq pen; 0.25 mg by SubCUTAneous route every seven (7) days. 1.  Weight management borderline controlled   Progress was reviewed with patient  Add 2 days of exercise  Add a yoga class to help improve her sleep    2. Labs    Latest results reviewed with patient       face to face time with Misael Porras consisted of counseling & coordinating and/or discussing treatment plans in reference to her obesity The primary encounter diagnosis was Class 2 severe obesity with body mass index (BMI) of 35 to 39.9 with serious comorbidity ([facility] [address] Diagnoses of Hyperglycemia, KEVIN on CPAP, Insomnia, unspecified type, and Insulin resistance were also pertinent to this visit. Principal Discharge DX:	Hypertensive urgency  Secondary Diagnosis:	Cerebrovascular accident (CVA), unspecified mechanism  Secondary Diagnosis:	Hyperlipidemia, unspecified hyperlipidemia type  Secondary Diagnosis:	PEG (percutaneous endoscopic gastrostomy) status  Secondary Diagnosis:	Type 2 diabetes mellitus without complication, without long-term current use of insulin  Secondary Diagnosis:	Anemia, unspecified type  Secondary Diagnosis:	Vitamin D deficiency Principal Discharge DX:	Hypertensive urgency  Goal:	SBP < 150/90  Instructions for follow-up, activity and diet:	Continue metoprolol 100mg BID, Lisinopril 40mg QD, and Hydralazine 50mg PO Q8H. If SBP > 150/90 then consider increasing hydralazine to 100mg PO q8h.  Secondary Diagnosis:	Cerebrovascular accident (CVA), unspecified mechanism  Goal:	avoid recurrence  Instructions for follow-up, activity and diet:	Continue ASA and Plavix  Secondary Diagnosis:	Hyperlipidemia, unspecified hyperlipidemia type  Goal:	LDL < 70  Instructions for follow-up, activity and diet:	Continue lipitor 80mg QD  Secondary Diagnosis:	PEG (percutaneous endoscopic gastrostomy) status  Goal:	feed adequately  Instructions for follow-up, activity and diet:	continue with tube feeding  Secondary Diagnosis:	Type 2 diabetes mellitus without complication, without long-term current use of insulin  Goal:	HbA1c < 7  Instructions for follow-up, activity and diet:	Continue home dose metformin 1000mg BID and glimiperide 2mg QD  Secondary Diagnosis:	Anemia, unspecified type  Goal:	Hgb > 8  Instructions for follow-up, activity and diet:	Monitor CBC. Continue with Fe Supplementation with IV Venofer.  Secondary Diagnosis:	Vitamin D deficiency  Goal:	Vitamin D level ~ 50  Instructions for follow-up, activity and diet:	Continue with oral Vitamin D 1000U QD. Follow-up with Vitamin D level re-check within 4-6 weeks. Principal Discharge DX:	Hypertensive urgency  Goal:	SBP < 150/90  Instructions for follow-up, activity and diet:	Continue metoprolol 100mg BID, Lisinopril 40mg QD, and Hydralazine 50mg PO Q8H. If SBP > 150/90 then consider increasing hydralazine to 100mg PO q8h.  Secondary Diagnosis:	Cerebrovascular accident (CVA), unspecified mechanism  Goal:	avoid recurrence  Instructions for follow-up, activity and diet:	Continue ASA and Plavix  Secondary Diagnosis:	Hyperlipidemia, unspecified hyperlipidemia type  Goal:	LDL < 70  Instructions for follow-up, activity and diet:	Continue lipitor 80mg QD  Secondary Diagnosis:	PEG (percutaneous endoscopic gastrostomy) status  Goal:	feed adequately  Instructions for follow-up, activity and diet:	continue with tube feeding with Glucerna. Patient was previously getting Jevity.  Secondary Diagnosis:	Type 2 diabetes mellitus without complication, without long-term current use of insulin  Goal:	HbA1c 6.5 - 7.5  Instructions for follow-up, activity and diet:	HbA1c 5.7 during this admission. Likely having hypoglycemic episodes in previous regimen. Changed to regimen of 500mg Metformin BID. Consider adding back previous glimiperide 2mg QD if needed. Tube feeds were changed from previous Jevity to Glucerna during this admission.  Secondary Diagnosis:	Anemia, unspecified type  Goal:	Hgb > 8  Instructions for follow-up, activity and diet:	Monitor CBC. Continue with Fe Supplementation with IV Venofer.  Secondary Diagnosis:	Vitamin D deficiency  Goal:	Vitamin D level ~ 50  Instructions for follow-up, activity and diet:	Continue with oral Vitamin D 1000U QD. Follow-up with Vitamin D level re-check within 4-6 weeks. Principal Discharge DX:	Hypertensive urgency  Goal:	SBP < 150/90  Instructions for follow-up, activity and diet:	Continue metoprolol 100mg BID, Lisinopril 40mg QD, and Hydralazine 50mg PO Q8H. If SBP > 150/90 then consider increasing hydralazine to 100mg PO q8h.  Secondary Diagnosis:	Cerebrovascular accident (CVA), unspecified mechanism  Goal:	avoid recurrence  Instructions for follow-up, activity and diet:	Continue ASA and Plavix  Secondary Diagnosis:	Hyperlipidemia, unspecified hyperlipidemia type  Goal:	LDL < 70  Instructions for follow-up, activity and diet:	Continue lipitor 80mg QD  Secondary Diagnosis:	PEG (percutaneous endoscopic gastrostomy) status  Goal:	feed adequately  Instructions for follow-up, activity and diet:	continue with tube feeding with Glucerna. Patient was previously getting Jevity. Patient received Free H20 300ml TID during admission. Monitor Na upon discharge.  Secondary Diagnosis:	Type 2 diabetes mellitus without complication, without long-term current use of insulin  Goal:	HbA1c 7.0  Instructions for follow-up, activity and diet:	HbA1c 5.7 during this admission. Likely having hypoglycemic episodes in previous regimen. Changed to regimen of 500mg Metformin BID. Consider adding back previous glimiperide 2mg QD if needed. Tube feeds were changed from previous Jevity to Glucerna during this admission.  Secondary Diagnosis:	Anemia, unspecified type  Goal:	Hgb > 8  Instructions for follow-up, activity and diet:	Monitor CBC. Continue with Fe Supplementation with IV Venofer.  Secondary Diagnosis:	Vitamin D deficiency  Goal:	Vitamin D level ~ 50  Instructions for follow-up, activity and diet:	Continue with oral Vitamin D 1000U QD. Follow-up with Vitamin D level re-check within 4-6 weeks. Principal Discharge DX:	Hypertensive urgency  Goal:	SBP < 150/90  Instructions for follow-up, activity and diet:	Continue metoprolol 100mg BID, Lisinopril 40mg QD, and Hydralazine 50mg PO thrice daily. If BP > 150/90 then consider increasing hydralazine to 100mg PO thrice daily.  Secondary Diagnosis:	Cerebrovascular accident (CVA), unspecified mechanism  Goal:	avoid recurrence  Instructions for follow-up, activity and diet:	Continue Plavix.  Secondary Diagnosis:	Hyperlipidemia, unspecified hyperlipidemia type  Instructions for follow-up, activity and diet:	Continue lipitor 80mg daily s/p CVA  Secondary Diagnosis:	PEG (percutaneous endoscopic gastrostomy) status  Goal:	feed adequately  Instructions for follow-up, activity and diet:	continue with tube feeding with Glucerna. Patient was previously getting Jevity. Patient received Free H20 300ml TID during admission. Monitor Na upon discharge.  Continue free water 300 ml thrice daily with feeds.  Please continue Glucerna 75ml/ hr for 16 hrs or equivalent amount in 24 hrs.  Secondary Diagnosis:	Type 2 diabetes mellitus without complication, without long-term current use of insulin  Goal:	HbA1c 7.0  Instructions for follow-up, activity and diet:	HbA1c 5.7 during this admission. Likely having hypoglycemic episodes in previous regimen. Changed to regimen of 500mg Metformin BID. Increase to 1000 mg twice daily if sugars more than 150.   Consider adding back previous glimiperide only  if needed and sugars more than 150 persistently.    Tube feeds were changed from previous Jevity to Glucerna during this admission.  Secondary Diagnosis:	Anemia, unspecified type  Goal:	Hgb > 9gm;  Instructions for follow-up, activity and diet:	Monitor CBC. Continue with Fe Supplementation. Given Venofer; Stool guaiac was negative for Occult blood.  Secondary Diagnosis:	Vitamin D deficiency  Goal:	Vitamin D level ~ 50  Instructions for follow-up, activity and diet:	Continue with oral Vitamin D 1000U QD. Follow-up with Vitamin D level re-check within 4-6 weeks.

## 2023-01-17 NOTE — PROGRESS NOTES
[facility] Progress Note:   F/up Physician Visit    CC: Weight Management      Maximino Romero is a 61 y.o. female who is here for her  f/up physician visit for the LCD Program.  Post GBP  Lowest weight was 140  Now 199  Goal 170   Oct was 199  Nov 194  and now 199 again          Weight Metrics 1/17/2023 1/17/2023 12/13/2022 11/22/2022 11/22/2022 10/6/2022 9/13/2022   Weight - 199 lb 11.2 oz 200 lb - 194 lb 12.8 oz 199 lb -   Neck Circ (inches) 11 - - 12 - - 12.5   Waist Measure Inches 36.5 - - 34.25 - - 36   Body Fat % 41.7 - - 41 - - 41.7   BMI - 35.38 kg/m2 35.43 kg/m2 - 34.51 kg/m2 35.25 kg/m2 -         Outpatient Medications Marked as Taking for the 1/17/23 encounter (Office Visit) with Timi Giraldo MD   Medication Sig Dispense Refill    semaglutide (Ozempic) 0.25 mg or 0.5 mg/dose (2 mg/1.5 ml) subq pen 0.25 mg by SubCUTAneous route every seven (7) days. 1 Box 1    PreviDent 5000 Plus 1.1 % crea BRUSH 2 MINUTES WITH PEASIZED TOOTHPASTE AT BEDTIME AFTER REGULAR BRUSHING THEN SPIT OUT. NOT FOOD OR DRINK AFTERWARD      ergocalciferol (ERGOCALCIFEROL) 1,250 mcg (50,000 unit) capsule TAKE 1 CAPSULE BY MOUTH EVERY 7 DAYS FOR 90 DDAYS      gabapentin (NEURONTIN) 300 mg capsule Take 300 mg by mouth as needed. metFORMIN ER (GLUCOPHAGE XR) 500 mg tablet Take 500 mg by mouth daily (with dinner). Repots takes as needed      cholecalciferol (VITAMIN D3) 25 mcg (1,000 unit) cap Take 1,000 Units by mouth daily. pregabalin (LYRICA) 150 mg capsule Take 150 mg by mouth daily. As needed      buPROPion XL (WELLBUTRIN XL) 150 mg tablet Take 1 Tablet by mouth daily. 90 Tablet 0    sodium fluoride-pot nitrate 1.1-5 % pste       bumetanide (BUMEX) 1 mg tablet Take 1 Tablet by mouth Three (3) times a week. (Patient taking differently: Take 1 mg by mouth every Monday, Wednesday, Friday.) 90 Tablet 0    anastrozole (ARIMIDEX) 1 mg tablet Take 1 mg by mouth daily.       potassium 99 mg tablet Take 1 Tablet by mouth daily. 90 Tablet 0    acetaminophen (TYLENOL ARTHRITIS PO) Take  by mouth two (2) times daily as needed. cyanocobalamin (VITAMIN B12) 500 mcg tablet Take 500 mcg by mouth every morning. sucralfate (CARAFATE) 1 gram tablet Take 1 g by mouth daily as needed. Prn       linaclotide (LINZESS) 290 mcg cap capsule Take 290 mcg by mouth daily as needed. Participation   Did you attend clinic and class last week? no    Review of Systems  Since your last visit, have you experienced any complications? no  If yes, please list:       Are you taking an appetite suppressant? no  If so, is there any Chest Pain, Palpitations or Dizziness? HUNGER CONTROL: fair    BP Readings from Last 3 Encounters:   01/17/23 116/74   12/13/22 130/76   11/22/22 111/75       SLEEP:6-8 hours, takes a sleep aid most nights  Tylenol pm or lyrica  Occassional lorazepam  Metformin also makes her sleep     Have you received any other medical care this week? no  If yes, where and for what? Have you discontinued or changed any medicine or dose of your medicine since your last visit with Dr Kori Dumont? no  If yes, where and for what? Diet  How many ounces of calorie-free liquids did you consume each day? 64 oz    How many meal replacements did you take each day? 1 of ours and 2 carnation lite breakfast a day    Did you have any problems adhering to the program?  no If yes, please explain:      Exercise  Aerobic exercise: 30 min 3 days a week  Resistance exercise: 3 workouts / week  Any discomfort?  no     If yes, where? Objective  Visit Vitals  /74 (BP 1 Location: Left upper arm, BP Patient Position: Sitting, BP Cuff Size: Adult)   Pulse 65   Temp 97.3 °F (36.3 °C) (Oral)   Ht 5' 3\" (1.6 m)   Wt 199 lb 11.2 oz (90.6 kg)   SpO2 99%   BMI 35.38 kg/m²     No LMP recorded. Patient has had a hysterectomy.       Physical Exam  Appearance: well,   Mental:A&O x 3, NAD  H:NC/AT,  EENT:   EOMI, PERRL, No scleral icterus  Neck: no bruit or JVD  Lung: clear, No W/R  ABD: soft, active, nontender  Ext:  no Edema  Neuro: nonfocal  Assessment / Plan    Encounter Diagnoses   Name Primary? Class 2 severe obesity with body mass index (BMI) of 35 to 39.9 with serious comorbidity (HCC) Yes    Hyperglycemia     KEVIN on CPAP     Insomnia, unspecified type     Insulin resistance      Diagnoses and all orders for this visit:    1. Class 2 severe obesity with body mass index (BMI) of 35 to 39.9 with serious comorbidity (HCC)  Cont wellbutrin  mg a day  Increase exercise to 4-5 days a week  Work more closely with the dietitian to monitor the diet    2. Hyperglycemia  -     semaglutide (Ozempic) 0.25 mg or 0.5 mg/dose (2 mg/1.5 ml) subq pen; 0.25 mg by SubCUTAneous route every seven (7) days. Metformin xr 500 mg - she does not take daily, she says as needed  3. KEVIN on CPAP  Create a sleep  routine, go to bed and wake up at the same time  4. Insomnia, unspecified type    Add a yoga class to help improve her sleep  5. Insulin resistance  -     semaglutide (Ozempic) 0.25 mg or 0.5 mg/dose (2 mg/1.5 ml) subq pen; 0.25 mg by SubCUTAneous route every seven (7) days. 1.  Weight management borderline controlled   Progress was reviewed with patient      2. Labs    Latest results reviewed with patient       face to face time with Jahaira Cunningham consisted of counseling & coordinating and/or discussing treatment plans in reference to her obesity The primary encounter diagnosis was Class 2 severe obesity with body mass index (BMI) of 35 to 39.9 with serious comorbidity ([facility] [address] Diagnoses of Hyperglycemia, KEVIN on CPAP, Insomnia, unspecified type, and Insulin resistance were also pertinent to this visit.

## 2023-01-17 NOTE — PROGRESS NOTES
Identified pt with two pt identifiers (name and ). Reviewed chart in preparation for visit and have obtained necessary documentation. Alonso Marie is a 61 y.o. female  Chief Complaint   Patient presents with    Weight Management     Visit Vitals  /74 (BP 1 Location: Left upper arm, BP Patient Position: Sitting, BP Cuff Size: Adult)   Pulse 65   Temp 97.3 °F (36.3 °C) (Oral)   Ht 5' 3\" (1.6 m)   Wt 199 lb 11.2 oz (90.6 kg)   SpO2 99%   BMI 35.38 kg/m²     BMI 35    1. Have you been to the ER, urgent care clinic since your last visit? Hospitalized since your last visit? No    2. Have you seen or consulted any other health care providers outside of the [address] [place] [address] since your last visit? Include any pap smears or colon screening.  No

## 2023-01-18 ENCOUNTER — OFFICE VISIT (OUTPATIENT)
Dept: CARDIOLOGY CLINIC | Age: 58
End: 2023-01-18
Payer: MEDICARE

## 2023-01-18 VITALS
BODY MASS INDEX: 35.9 KG/M2 | HEART RATE: 60 BPM | OXYGEN SATURATION: 98 % | WEIGHT: 202.6 LBS | DIASTOLIC BLOOD PRESSURE: 80 MMHG | RESPIRATION RATE: 15 BRPM | SYSTOLIC BLOOD PRESSURE: 120 MMHG | HEIGHT: 63 IN

## 2023-01-18 DIAGNOSIS — E78.00 HIGH CHOLESTEROL: ICD-10-CM

## 2023-01-18 DIAGNOSIS — E66.9 CLASS 2 OBESITY WITH BODY MASS INDEX (BMI) OF 35.0 TO 35.9 IN ADULT, UNSPECIFIED OBESITY TYPE, UNSPECIFIED WHETHER SERIOUS COMORBIDITY PRESENT: ICD-10-CM

## 2023-01-18 DIAGNOSIS — I49.3 PVC'S (PREMATURE VENTRICULAR CONTRACTIONS): ICD-10-CM

## 2023-01-18 DIAGNOSIS — C50.919 MALIGNANT NEOPLASM OF FEMALE BREAST, UNSPECIFIED ESTROGEN RECEPTOR STATUS, UNSPECIFIED LATERALITY, UNSPECIFIED SITE OF BREAST (HCC): ICD-10-CM

## 2023-01-18 DIAGNOSIS — I89.0 SECONDARY LYMPHEDEMA: Primary | ICD-10-CM

## 2023-01-18 PROCEDURE — 99213 OFFICE O/P EST LOW 20 MIN: CPT | Performed by: NURSE PRACTITIONER

## 2023-01-18 RX ORDER — BUMETANIDE 1 MG/1
1 TABLET ORAL AS NEEDED
Qty: 90 TABLET | Refills: 1 | Status: SHIPPED | OUTPATIENT
Start: 2023-01-18

## 2023-01-18 RX ORDER — EPINEPHRINE 0.22MG
100 AEROSOL WITH ADAPTER (ML) INHALATION DAILY
COMMUNITY
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
## 2023-01-18 NOTE — PATIENT INSTRUCTIONS
Everything looks great from a cardiovascular standpoint. Continue all of your prescribed medications exactly as you are. You can continue to use the Bumex once a week, we can also use it daily as needed if you feel like your legs are extra swollen, tight, or heavy. You could benefit from seeing lymphedema clinic. Attached is their office location and number. Since everything looks well, we will plan on seeing you in approximately 1 year for follow-up.     [facility]  [address], [address] [facility], [place] [address]    Tel: [phone]

## 2023-02-06 ENCOUNTER — CLINICAL SUPPORT (OUTPATIENT)
Dept: SURGERY | Age: 58
End: 2023-02-06

## 2023-02-06 DIAGNOSIS — E66.01 CLASS 2 SEVERE OBESITY WITH BODY MASS INDEX (BMI) OF 35 TO 39.9 WITH SERIOUS COMORBIDITY (HCC): Primary | ICD-10-CM

## 2023-02-06 NOTE — PROGRESS NOTES
[facility]  Metabolic Program Follow-up Nutrition Consult    Date: 2023   Physician: Nicole Fritz MD  Name: Vincent Vance  :  1965    Type of Plan: LCD  Weeks on Plan: 10 months  Virtual visit was completed through [address] [place] [address]. ASSESSMENT:    Medications/Supplements:   Prior to Admission medications    Medication Sig Start Date End Date Taking? Authorizing Provider   co-enzyme Q-10 (Co Q-10) 100 mg capsule Take 100 mg by mouth daily. Provider, Historical   LUTEIN PO Take 1 Tablet by mouth daily. Provider, Historical   bumetanide (BUMEX) 1 mg tablet Take 1 Tablet by mouth as needed (Leg swelling). Indications: visible water retention 23   Melinda Thomas NP   semaglutide (Ozempic) 0.25 mg or 0.5 mg/dose (2 mg/1.5 ml) subq pen 0.25 mg by SubCUTAneous route every seven (7) days. 23   Zhanna Velasco MD   PreviDent 5000 Plus 1.1 % crea BRUSH 2 MINUTES WITH PEASIZED TOOTHPASTE AT BEDTIME AFTER REGULAR BRUSHING THEN SPIT OUT. NOT FOOD OR DRINK AFTERWARD 22   Provider, Historical   gabapentin (NEURONTIN) 300 mg capsule Take 300 mg by mouth as needed. 22   Provider, Historical   metFORMIN ER (GLUCOPHAGE XR) 500 mg tablet Take 500 mg by mouth daily (with dinner). Repots takes as needed 22   Provider, Historical   cholecalciferol (VITAMIN D3) 25 mcg (1,000 unit) cap Take 1,000 Units by mouth daily. 7/15/22   Provider, Historical   pregabalin (LYRICA) 150 mg capsule Take 150 mg by mouth daily. As needed    Provider, Historical   anastrozole (ARIMIDEX) 1 mg tablet Take 1 mg by mouth daily. Provider, Historical   potassium 99 mg tablet Take 1 Tablet by mouth daily. 21   Melinda Thomas NP   acetaminophen (TYLENOL ARTHRITIS PO) Take  by mouth two (2) times daily as needed. Provider, Historical   cyanocobalamin (VITAMIN B12) 500 mcg tablet Take 500 mcg by mouth every morning.     Provider, Historical   sucralfate (CARAFATE) 1 gram tablet Take 1 g by mouth daily as needed. Prn     Provider, Historical   linaclotide (LINZESS) 290 mcg cap capsule Take 290 mcg by mouth daily as needed. Provider, Historical              Starting Weight: 198#   Current Weight: 202# (194# last visit one lizzie ago)  Overall Pounds Lost: 0  Overall Pounds Gained: 4#    Exercise/Physical Activity:not reported    Is patient using New Directions products:yes and no  If yes, how many per day: 0-2 - hasn't been actively participating in [facility] since August 2022    Aversions/side effects of product/program:none    Fluids used to mix with products:water    Reported Diet History:  Sick last two weeks with suspected Covid. No appetite but staying hydrated. Chicken noodle soup Tues-Thurs last week. Thurs: Boiled egg, turkey sausage, 4 ounces oj. Fri: 2 healthy choice meals, one was spaghetti, 35 ounces water. Sat: sausage nataliya, egg with cheese, 1 slice raisin toast, 4 ounces oj.  40 ounces water. Handful cheeseits. Soup and saltine crackers. Sun: Healthy choice, corn on cob. No ND bars or shakes in last month. Downloaded water birgit, drinking more than previous visits, helping her stay on track. 65 ounces daily. Barriers/concerns preventing patient from achieving goal(s) since last visit: sick with suspected covid last few weeks, hasn't started Ozempic yet due to this. NUTRITION INTERVENTION:  Pt educated on nutrition recommendations for the [employer]irection Low Calorie Plan (LCD), with the specific meal pattern of 2 meal replacements every day plus a grocery meal and snack. Daily recommended totals: 1200 calories, 60 grams carbs, 80+ grams protein, and remaining calories as healthy fats. Use LCD handout for meal and snack suggestions and preparation.     Grocery meal:  Use the balanced plate method to plan meals, include 3-6 oz of lean source of protein, unlimited non-starchy vegetables, 1/2 cup whole grains/beans OR 1/2 cup fruit OR 1 serving of low fat dairy. Utilize handouts listing healthy snack and meal ideas. Read all nutrition labels. Demonstrated and emphasized identifying serving size, total fat, sugar and protein content. Defined low fat as </= 3 g per serving. Discussed lean and extra lean sources of protein. Avoid foods with sugar listed in the first 3 ingredients and >/10 g sugar per serving. Consume meal replacements every three to four hours or pattern as discussed with provider. Mix with water. May add herbs/spices for taste. Practice mindful eating habits; take small bites, chew thoroughly, avoid distractions, utilize hunger/fullness scale. Reviewed attendance policy of attending weekly nutrition classes. Metabolic support group recommended, and increase physical activity (approved per MD) for long term weight maintenance. NUTRITION MONITORING AND EVALUATION: 8# gain since last visit, but last few months have been difficult and she has been off track. Sick, moved, holidays are among the barriers. She is not completing all parts of [facility] program.   Water consistent but meal patterns and choices are not. Plans to start Ozempic this week. Encouraged pt to contact  to discuss program status, no followup scheduled today. Specific tips and techniques to facilitate compliance with above recommendations were provided and discussed. If further details are desired please contact me at [phone]. This phone number was also provided to the patient for any further questions or concerns.           Joanna Vieira MS, RD, LDN

## 2023-02-09 ENCOUNTER — TRANSCRIBE ORDER (OUTPATIENT)
Dept: SCHEDULING | Age: 58
End: 2023-02-09

## 2023-02-09 DIAGNOSIS — M47.812 CERVICAL SPONDYLOSIS WITHOUT MYELOPATHY: ICD-10-CM

## 2023-02-09 DIAGNOSIS — M51.34 DDD (DEGENERATIVE DISC DISEASE), THORACIC: ICD-10-CM

## 2023-02-09 DIAGNOSIS — M47.814 THORACIC SPONDYLOSIS WITHOUT MYELOPATHY: ICD-10-CM

## 2023-02-09 DIAGNOSIS — M54.6 PAIN IN THORACIC SPINE: ICD-10-CM

## 2023-02-09 DIAGNOSIS — M51.36 DDD (DEGENERATIVE DISC DISEASE), LUMBAR: ICD-10-CM

## 2023-02-09 DIAGNOSIS — M50.30 DDD (DEGENERATIVE DISC DISEASE), CERVICAL: ICD-10-CM

## 2023-02-09 DIAGNOSIS — M47.816 LUMBAR SPONDYLOSIS: ICD-10-CM

## 2023-02-09 DIAGNOSIS — Z98.890 HISTORY OF LUMBAR LAMINECTOMY: ICD-10-CM

## 2023-02-09 DIAGNOSIS — M54.2 CERVICALGIA: ICD-10-CM

## 2023-02-09 DIAGNOSIS — M54.16 RADICULOPATHY OF LUMBAR REGION: ICD-10-CM

## 2023-02-09 DIAGNOSIS — M54.50 LUMBAR PAIN: Primary | ICD-10-CM

## 2023-02-09 DIAGNOSIS — M54.32 LEFT SIDED SCIATICA: ICD-10-CM

## 2023-02-21 ENCOUNTER — HOSPITAL ENCOUNTER (OUTPATIENT)
Dept: PHYSICAL THERAPY | Age: 58
Discharge: HOME | End: 2023-02-21
Payer: MEDICARE

## 2023-02-21 ENCOUNTER — OFFICE VISIT (OUTPATIENT)
Dept: SURGERY | Age: 58
End: 2023-02-21
Payer: MEDICARE

## 2023-02-21 VITALS
WEIGHT: 199.2 LBS | BODY MASS INDEX: 35.3 KG/M2 | SYSTOLIC BLOOD PRESSURE: 119 MMHG | HEART RATE: 54 BPM | RESPIRATION RATE: 18 BRPM | DIASTOLIC BLOOD PRESSURE: 77 MMHG | OXYGEN SATURATION: 97 % | HEIGHT: 63 IN | TEMPERATURE: 98 F

## 2023-02-21 VITALS — HEIGHT: 63 IN | BODY MASS INDEX: 35.54 KG/M2 | WEIGHT: 200.6 LBS

## 2023-02-21 DIAGNOSIS — Z99.89 OSA ON CPAP: ICD-10-CM

## 2023-02-21 DIAGNOSIS — R73.9 HYPERGLYCEMIA: ICD-10-CM

## 2023-02-21 DIAGNOSIS — E66.01 CLASS 2 SEVERE OBESITY WITH BODY MASS INDEX (BMI) OF 35 TO 39.9 WITH SERIOUS COMORBIDITY (HCC): Primary | ICD-10-CM

## 2023-02-21 DIAGNOSIS — G47.33 OSA ON CPAP: ICD-10-CM

## 2023-02-21 DIAGNOSIS — E88.81 INSULIN RESISTANCE: ICD-10-CM

## 2023-02-21 DIAGNOSIS — G47.00 INSOMNIA, UNSPECIFIED TYPE: ICD-10-CM

## 2023-02-21 PROCEDURE — G8417 CALC BMI ABV UP PARAM F/U: HCPCS | Performed by: FAMILY MEDICINE

## 2023-02-21 PROCEDURE — 97162 PT EVAL MOD COMPLEX 30 MIN: CPT

## 2023-02-21 PROCEDURE — G8427 DOCREV CUR MEDS BY ELIG CLIN: HCPCS | Performed by: FAMILY MEDICINE

## 2023-02-21 PROCEDURE — 3017F COLORECTAL CA SCREEN DOC REV: CPT | Performed by: FAMILY MEDICINE

## 2023-02-21 PROCEDURE — 97140 MANUAL THERAPY 1/> REGIONS: CPT

## 2023-02-21 PROCEDURE — 99214 OFFICE O/P EST MOD 30 MIN: CPT | Performed by: FAMILY MEDICINE

## 2023-02-21 PROCEDURE — 97110 THERAPEUTIC EXERCISES: CPT

## 2023-02-21 PROCEDURE — G9717 DOC PT DX DEP/BP F/U NT REQ: HCPCS | Performed by: FAMILY MEDICINE

## 2023-02-21 PROCEDURE — 97163 PT EVAL HIGH COMPLEX 45 MIN: CPT

## 2023-02-21 RX ORDER — AZITHROMYCIN 250 MG/1
TABLET, FILM COATED ORAL
COMMUNITY

## 2023-02-21 RX ORDER — FLUCONAZOLE 100 MG/1
100 TABLET ORAL DAILY
COMMUNITY
Start: 2023-02-01

## 2023-02-21 RX ORDER — LORAZEPAM 0.5 MG/1
0.5 TABLET ORAL
COMMUNITY
Start: 2023-01-18

## 2023-02-21 NOTE — THERAPY EVALUATION
Statement of Medical Necessity  Page 1 of 2      Liliana Lopez 1965 Today's Date: 2/21/2023 BENSON: [employer]   Payor: [employer] Brought / Plan: [address] [place] [address] HMO / Product Type: Managed Care Medicare /  ME: TBD  Refills: 2                   Diagnosis  []   I97.2 Post-Mastectomy Lymphedema []   I87.2 Venous Insufficiency   [x]   I89.0 Lymphedema, other secondary  []   I83.019 Venous Stasis Ulcer LE, Right   []   I89.9 Unspecified Lymphatic Disorder []   I83.029 Venous Stasis Ulcer LE, Left   [x]   R60.9 Swelling not relieved by elevation []   Q82.0 Hereditary/ Congenital Lymphedema   []   C50.211 Malignant neoplasm of breast, Right []   G89.3  Cancer associated pain   []   C50.212 Malignant neoplasm of breast, Left []   L03.115 LE Cellulitis, Right   []    []   L03.116 LE Cellulitis, Left                                   Liliana Lopez    1965  Page 2 of 2    Physician Order for DME for Diagnosis of Lymphedema as Listed on Statement of Medical Necessity, Page 1         Recommended Product:  Units Upper Extremity Rt Lt Units Lower Extremity Rt Lt    Circ-Aid, Ready Wrap, Sigvaris Arm     Inelastic binders (Circ-Aid, Farrow)  []Foot   []Below Knee   []Knee   []Thigh      Circ-Aid Ready Wrap, Sigvaris hand    Kristian Manan, night use []Full Leg  []Lower Leg      Tribute Arm, night use     2 Tribute, night use  [x]Full Leg  [x]Lower Leg x x    Kristian Manan Arm, night use    Derrell Sleeve Leg/ Foot, night use     8 Gradiant Compression Sleeves  []Custom [x] RM Arm:  [x]CCL1 []CCL2 []CCL3  []Custom [x] RM:  [x]Glove  [x]Gauntlet:                         [x]CCL1 []CCL2 []CCL3   x x 4 Gradient Compression Stockings   [x]Custom  []RM Lower Extremity:   []CCL1       [x]CCL2         []CCL3   [x]Knee       []Thigh        []Waist Length x x    Derrell sleeve arm w/ hand, night use    Tribute Wrap, night use      Compression Bra   2 Waist Length RM Lower Extremity Gradient Compression Stockings x x    Compression Vest The above patient was referred for treatment of Lymphedema due to the diagnosis indicated above. Lymphedema is a progressive and chronic condition. It may cause acute infections, muscle atrophy, discomfort, and connective tissue fibrosis with irreversible tissue damage, decreased ROM in the affected limb and diminished functional mobility possibly interfering with independence and ability to work. Recurrent infections and wound complications that commonly occur with Lymphedema often require hospitalization and extensive wound care, thus increasing medical costs. The patient has received complete decongestive therapy which includes manual lymphatic drainage, lymphedema specific exercises, compression bandaging, and hygiene/skin care. Goals of therapy are to reduce the edema and prevent re-accumulation of fluid with its complications. It is medically necessary for this patient to have daytime garments to control this condition. They will need 2 sets (one set to wear and one set to wash for adequate skin care and wearing time). Garments must be replaced every 4-6 months for effectiveness. There are no substitutes available that offer acceptable compression treatment for this Lymphedema patient. If further information is requested, please contact our certified lymphedema therapists at [phone].     [x] Roseanna Velazquez, PT, MSPT, CLT-VIKI [] Stefania Dumont, MS, OTR/L, CLT []  Irma Santamaria, PT, CLT [] Cherrie Shannon, PTA, CLT, CSIS    Printed  Provider Name Marichuy Rico NP Provider Signature  Date

## 2023-02-21 NOTE — PROGRESS NOTES
Weight Management. 1 month follow up    1. Have you been to the ER, urgent care clinic since your last visit? Hospitalized since your last visit? No    2. Have you seen or consulted any other health care providers outside of the [address] [place] [address] since your last visit? Include any pap smears or colon screening.  No    BMI - 34.9

## 2023-02-21 NOTE — PROGRESS NOTES
[facility] Progress Note:   F/up Physician Visit    CC: Weight Management      Vivian Chan is a 61 y.o. female who is here for her  f/up physician visit for the  LCD Program.    She is post GBP  Her weight today is 199 and was  199 in January  She is no longer drinking gatoraid  She had a one on one with dietitian  She is drinking more water  She is taking ozempic but as we talk I realize that she has not been removing the inner needle cover so she has not been getting the medicine      Weight Metrics 2/21/2023 2/21/2023 1/18/2023 1/17/2023 1/17/2023 12/13/2022 11/22/2022   Weight - 199 lb 3.2 oz 202 lb 9.6 oz - 199 lb 11.2 oz 200 lb -   Neck Circ (inches) 12.25 - - 11 - - 12   Waist Measure Inches 37 - - 36.5 - - 34.25   Body Fat % 42.6 - - 41.7 - - 41   BMI - 35.29 kg/m2 35.89 kg/m2 - 35.38 kg/m2 35.43 kg/m2 -         Outpatient Medications Marked as Taking for the 2/21/23 encounter (Office Visit) with Librado Booth MD   Medication Sig Dispense Refill    LORazepam (ATIVAN) 0.5 mg tablet Take 0.5 mg by mouth nightly. co-enzyme Q-10 (CO Q-10) 100 mg capsule Take 100 mg by mouth daily. LUTEIN PO Take 1 Tablet by mouth daily. bumetanide (BUMEX) 1 mg tablet Take 1 Tablet by mouth as needed (Leg swelling). Indications: visible water retention 90 Tablet 1    semaglutide (Ozempic) 0.25 mg or 0.5 mg/dose (2 mg/1.5 ml) subq pen 0.25 mg by SubCUTAneous route every seven (7) days. 1 Box 1    PreviDent 5000 Plus 1.1 % crea BRUSH 2 MINUTES WITH PEASIZED TOOTHPASTE AT BEDTIME AFTER REGULAR BRUSHING THEN SPIT OUT. NOT FOOD OR DRINK AFTERWARD      gabapentin (NEURONTIN) 300 mg capsule Take 300 mg by mouth as needed. metFORMIN ER (GLUCOPHAGE XR) 500 mg tablet Take 500 mg by mouth as needed. cholecalciferol (VITAMIN D3) 25 mcg (1,000 unit) cap Take 1,000 Units by mouth daily. pregabalin (LYRICA) 150 mg capsule Take 150 mg by mouth as needed.       anastrozole (ARIMIDEX) 1 mg tablet Take 1 mg by mouth daily. potassium 99 mg tablet Take 1 Tablet by mouth daily. 90 Tablet 0    acetaminophen (TYLENOL ARTHRITIS PO) Take  by mouth two (2) times daily as needed. cyanocobalamin (VITAMIN B12) 500 mcg tablet Take 500 mcg by mouth every morning. sucralfate (CARAFATE) 1 gram tablet Take 1 g by mouth daily as needed. Prn       linaclotide (LINZESS) 290 mcg cap capsule Take 290 mcg by mouth daily as needed. Participation   Did you attend clinic and class last week? yes    Review of Systems  Since your last visit, have you experienced any complications? no  If yes, please list:       Are you taking an appetite suppressant? yes  If so, is there any Chest Pain, Palpitations or Dizziness? HUNGER CONTROL: fair    BP Readings from Last 3 Encounters:   02/21/23 119/77   01/18/23 120/80   01/17/23 116/74       SLEEP:4 , she is taking lorazepam  Not using the cpap    Have you received any other medical care this week? no  If yes, where and for what? Have you discontinued or changed any medicine or dose of your medicine since your last visit with Dr Kori Dumont? no  If yes, where and for what? Diet  How many ounces of calorie-free liquids did you consume each day?  70 oz    How many meal replacements did you take each day? 4    Did you have any problems adhering to the program?  no If yes, please explain:      Exercise  Aerobic exercise: gym 3 days 30 min  treadmill  Resistance exercise: 0 workouts / week  Any discomfort? If yes, where? Objective  Visit Vitals  /77 (BP 1 Location: Right arm, BP Patient Position: Sitting, BP Cuff Size: Large adult)   Pulse (!) 54   Temp 98 °F (36.7 °C) (Oral)   Resp 18   Ht 5' 3\" (1.6 m)   Wt 199 lb 3.2 oz (90.4 kg)   SpO2 97%   BMI 35.29 kg/m²     No LMP recorded. Patient has had a hysterectomy.       Physical Exam  Appearance: well,   Mental:A&O x 3, NAD  H:NC/AT,  EENT:   EOMI, PERRL, No scleral icterus  Neck: no bruit or JVD  Lung: clear, No W/R  ABD: soft, active, nontender  Ext:  no Edema  Neuro: nonfocal  Assessment / Plan    Encounter Diagnoses   Name Primary? Class 2 severe obesity with body mass index (BMI) of 35 to 39.9 with serious comorbidity (HCC) Yes    Hyperglycemia     KEVIN on CPAP     Insomnia, unspecified type     Insulin resistance      Diagnoses and all orders for this visit:    1. Class 2 severe obesity with body mass index (BMI) of 35 to 39.9 with serious comorbidity (HCC)  Doing enough exercise  Aim for 8 hours sleep  Cont the lcd  2. Hyperglycemia  Continue the 0.25 mg dose 3 more weeks    3. KEVIN on CPAP  Use cpap  4. Insomnia, unspecified type  Work on sleep hygiene  Consider behaviorist care  5. Insulin resistance    1. Weight management stable   Progress was reviewed with patient    2. Labs    Latest results reviewed with patient       face to face time with Jahaira Cunningham consisted of counseling & coordinating and/or discussing treatment plans in reference to her obesity The primary encounter diagnosis was Class 2 severe obesity with body mass index (BMI) of 35 to 39.9 with serious comorbidity ([address] Diagnoses of Hyperglycemia, KEVIN on CPAP, Insomnia, unspecified type, and Insulin resistance were also pertinent to this visit.
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
## 2023-02-21 NOTE — THERAPY EVALUATION
Outpatient Lymphedema Clinic  a part of [address] [place] [address] Rossanaedith Anderson, [address] [facility], [facility], [address]  Phone: [phone]  Fax: [phone]    Plan of Care/Statement of Necessity for Physical Therapy/Occupational Therapy Services  2-15    Patient name: Elinor Lui  : 1965  Provider#: [id number]  Referral source: Tracey Crespo*      Medical/Treatment Diagnosis: Lymphedema, not elsewhere classified [I89.0]     Prior Hospitalization: see medical history     Comorbidities: see evaluation  Prior Level of Function: Independent to modified independent with self care, independent gait without assistive device  Medications: Verified on Patient Summary List  Start of Care: 2023      Onset Date:  for upper extremities, LE has been present for many years   The Plan of Care and following information is based on the information from the initial evaluation. Assessment/ key information:     Elinor Lui is a 61 y.o. female who presents with stage 2 lymphedema of bilateral lower extremities that presents with symptoms indicative of lipedema and lymphedema. Patient is also s/p bilateral mastectomy with reconstruction and revision of reconstruction. She is presenting with mild late stage 1 upper extremity lymphedema secondary to the breast cancer history. Pain is present in upper and lower extremities. Decreased range of motion is present in right shoulder. She is motivated to improve her condition. Patient will benefit from complete decongestive therapy (CDT) including: Manual lymphatic drainage (MLD) technique and Short-stretch textile bandages/compression system to decongest limb as appropriate. This care is medically necessary due to the infection risk with lymphedema and to improve functional activities.   CDT is necessary to resolve swelling to allow patient to return to wearing normal clothes/footwear and prevent worsening of symptoms, such as venous stasis ulcerations, infections, or hospitalizations. Patient will be independent with home program strategies to allow improved ADL ability and mobility and to allow patient to return to greatest functional independence. Evaluation Complexity History HIGH Complexity :3+ comorbidities / personal factors will impact the outcome/ POC ; Examination HIGH Complexity : 4+ Standardized tests and measures addressing body structure, function, activity limitation and / or participation in recreation  ;Presentation MEDIUM Complexity : Evolving with changing characteristics  ; Clinical Decision Making Other outcome measures LLIS scores 59% impairment  HIGH   Overall Complexity Rating: MEDIUM    Problem List: pain affecting function, edema affecting function   Treatment Plan may include any combination of the following: Therapeutic exercise, Manual therapy, Self care/home management, skin care, measuring and fitting for compression garments, vasopneumatic device,  mutli-layer compression bandaging if needed, soft tissue mobilizations  Patient / Family readiness to learn indicated by: asking questions, trying to perform skills, and interest  Persons(s) to be included in education: patient (P)  Barriers to Learning/Limitations: yes;  financial patient has limited insurance coverage for compression garments  Patient Goal (s): reduce swelling and pain  Rehabilitation Potential: good    Short Term Goals: To be accomplished in 8 treatments:  1. Patient/Caregiver will demonstrate knowledge of signs/symptoms of infections/cellulitis and be independent in skin care to prevent cellulitis. 2.  Patient/Caregiver will demonstrate independence in lymphedema home program of therapeutic exercises to improve circulation and decongest UE to improve ADLs. 3.   Patient/Caregiver will demonstrate independence in lymphedema home program of therapeutic exercises to improve circulation and decongest UE to improve ADLs.   4.   Patient will be measured for appropriate bilateral lower compression garments to prevent worsening of swelling over time. 5.  Patient will be measured for bilateral upper extremity compression garments to prevent worsening of swelling over time. Long Term Goals: To be accomplished in 16 treatments:  1. Patient/Caregiver will show improvement in the Lymphedema Life Impact Scale by decreasing the score to 49% and thus allow improvement in patient's quality of life. 2.  Patient/Caregiver will be independent with don/doff of compression system for upper and lower extremities and independent in use in order to prevent reaccumulation of fluid at discharge. 3.  Increase right shoulder internal rotation active range of motion to 0-70 degrees and flexion to 0-170 degrees to improve self care activities and allow patient to bathe her back without limitation. 3. Patient/Caregiver will be independent in home lymphedema management. 4. Patient/Caregiver will demonstrate stable or reduced limb volumes to indicate appropriateness for restorative phase of care. Frequency / Duration: Patient to be seen 1 times per week for 6 treatments followed by 1 visit every 2-4 weeks for 8 treatments (total of 16 treatments)    Patient/ Caregiver education and instruction: self care and other treatment components and goals,compression garments, home exercise program     [x]  Plan of care has been reviewed with PTA        Certification Period: 2/21/2023 to 5/18/2023     JOHNNIE Tabares, KUSUM-VIKI 2/21/2023     ________________________________________________________________________    I certify that the above Therapy Services are being furnished while the patient is under my care. I agree with the treatment plan and certify that this therapy is necessary.     [de-identified] Signature:____________________  Date:____________Time: _________         Marley Less*

## 2023-02-23 ENCOUNTER — DOCUMENTATION ONLY (OUTPATIENT)
Dept: CARDIOLOGY CLINIC | Age: 58
End: 2023-02-23

## 2023-03-06 ENCOUNTER — APPOINTMENT (OUTPATIENT)
Dept: GENERAL RADIOLOGY | Age: 58
End: 2023-03-06
Attending: STUDENT IN AN ORGANIZED HEALTH CARE EDUCATION/TRAINING PROGRAM
Payer: MEDICARE

## 2023-03-06 ENCOUNTER — APPOINTMENT (OUTPATIENT)
Dept: CT IMAGING | Age: 58
End: 2023-03-06
Attending: STUDENT IN AN ORGANIZED HEALTH CARE EDUCATION/TRAINING PROGRAM
Payer: MEDICARE

## 2023-03-06 ENCOUNTER — HOSPITAL ENCOUNTER (EMERGENCY)
Age: 58
Discharge: HOME | End: 2023-03-06
Attending: STUDENT IN AN ORGANIZED HEALTH CARE EDUCATION/TRAINING PROGRAM
Payer: MEDICARE

## 2023-03-06 VITALS
HEIGHT: 64 IN | OXYGEN SATURATION: 100 % | TEMPERATURE: 97.5 F | WEIGHT: 191 LBS | HEART RATE: 56 BPM | DIASTOLIC BLOOD PRESSURE: 80 MMHG | SYSTOLIC BLOOD PRESSURE: 136 MMHG | RESPIRATION RATE: 18 BRPM | BODY MASS INDEX: 32.61 KG/M2

## 2023-03-06 DIAGNOSIS — R42 DIZZINESS: Primary | ICD-10-CM

## 2023-03-06 DIAGNOSIS — R07.89 ATYPICAL CHEST PAIN: ICD-10-CM

## 2023-03-06 LAB
ALBUMIN SERPL-MCNC: 3.4 G/DL (ref 3.5–5)
ALBUMIN/GLOB SERPL: 0.8 (ref 1.1–2.2)
ALP SERPL-CCNC: 96 U/L (ref 45–117)
ALT SERPL-CCNC: 22 U/L (ref 12–78)
ANION GAP SERPL CALC-SCNC: 2 MMOL/L (ref 5–15)
AST SERPL-CCNC: 19 U/L (ref 15–37)
BASOPHILS # BLD: 0.1 K/UL (ref 0–0.1)
BASOPHILS NFR BLD: 1 % (ref 0–1)
BILIRUB SERPL-MCNC: 0.3 MG/DL (ref 0.2–1)
BNP SERPL-MCNC: 187 PG/ML
BUN SERPL-MCNC: 18 MG/DL (ref 6–20)
BUN/CREAT SERPL: 21 (ref 12–20)
CALCIUM SERPL-MCNC: 9.4 MG/DL (ref 8.5–10.1)
CHLORIDE SERPL-SCNC: 111 MMOL/L (ref 97–108)
CO2 SERPL-SCNC: 26 MMOL/L (ref 21–32)
COMMENT, HOLDF: NORMAL
CREAT SERPL-MCNC: 0.85 MG/DL (ref 0.55–1.02)
D DIMER PPP FEU-MCNC: 0.29 MG/L FEU (ref 0–0.65)
DIFFERENTIAL METHOD BLD: NORMAL
EOSINOPHIL # BLD: 0.1 K/UL (ref 0–0.4)
EOSINOPHIL NFR BLD: 1 % (ref 0–7)
ERYTHROCYTE [DISTWIDTH] IN BLOOD BY AUTOMATED COUNT: 12.9 % (ref 11.5–14.5)
GLOBULIN SER CALC-MCNC: 4.2 G/DL (ref 2–4)
GLUCOSE SERPL-MCNC: 89 MG/DL (ref 65–100)
HCT VFR BLD AUTO: 41.2 % (ref 35–47)
HGB BLD-MCNC: 13.2 G/DL (ref 11.5–16)
IMM GRANULOCYTES # BLD AUTO: 0 K/UL (ref 0–0.04)
IMM GRANULOCYTES NFR BLD AUTO: 0 % (ref 0–0.5)
LYMPHOCYTES # BLD: 1.4 K/UL (ref 0.8–3.5)
LYMPHOCYTES NFR BLD: 27 % (ref 12–49)
MAGNESIUM SERPL-MCNC: 2 MG/DL (ref 1.6–2.4)
MCH RBC QN AUTO: 29.3 PG (ref 26–34)
MCHC RBC AUTO-ENTMCNC: 32 G/DL (ref 30–36.5)
MCV RBC AUTO: 91.4 FL (ref 80–99)
MONOCYTES # BLD: 0.3 K/UL (ref 0–1)
MONOCYTES NFR BLD: 7 % (ref 5–13)
NEUTS SEG # BLD: 3.1 K/UL (ref 1.8–8)
NEUTS SEG NFR BLD: 64 % (ref 32–75)
NRBC # BLD: 0 K/UL (ref 0–0.01)
NRBC BLD-RTO: 0 PER 100 WBC
PLATELET # BLD AUTO: 304 K/UL (ref 150–400)
PMV BLD AUTO: 9.3 FL (ref 8.9–12.9)
POTASSIUM SERPL-SCNC: 3.7 MMOL/L (ref 3.5–5.1)
PROT SERPL-MCNC: 7.6 G/DL (ref 6.4–8.2)
RBC # BLD AUTO: 4.51 M/UL (ref 3.8–5.2)
SAMPLES BEING HELD,HOLD: NORMAL
SODIUM SERPL-SCNC: 139 MMOL/L (ref 136–145)
TROPONIN I SERPL HS-MCNC: 39 NG/L (ref 0–51)
TROPONIN I SERPL HS-MCNC: 42 NG/L (ref 0–51)
WBC # BLD AUTO: 4.9 K/UL (ref 3.6–11)

## 2023-03-06 PROCEDURE — 71046 X-RAY EXAM CHEST 2 VIEWS: CPT

## 2023-03-06 PROCEDURE — 74011000636 HC RX REV CODE- 636: Performed by: STUDENT IN AN ORGANIZED HEALTH CARE EDUCATION/TRAINING PROGRAM

## 2023-03-06 PROCEDURE — 74011250636 HC RX REV CODE- 250/636: Performed by: STUDENT IN AN ORGANIZED HEALTH CARE EDUCATION/TRAINING PROGRAM

## 2023-03-06 PROCEDURE — 70450 CT HEAD/BRAIN W/O DYE: CPT

## 2023-03-06 PROCEDURE — 80053 COMPREHEN METABOLIC PANEL: CPT

## 2023-03-06 PROCEDURE — 84484 ASSAY OF TROPONIN QUANT: CPT

## 2023-03-06 PROCEDURE — 96361 HYDRATE IV INFUSION ADD-ON: CPT

## 2023-03-06 PROCEDURE — 83880 ASSAY OF NATRIURETIC PEPTIDE: CPT

## 2023-03-06 PROCEDURE — 96374 THER/PROPH/DIAG INJ IV PUSH: CPT

## 2023-03-06 PROCEDURE — 70496 CT ANGIOGRAPHY HEAD: CPT

## 2023-03-06 PROCEDURE — 36415 COLL VENOUS BLD VENIPUNCTURE: CPT

## 2023-03-06 PROCEDURE — 93005 ELECTROCARDIOGRAM TRACING: CPT

## 2023-03-06 PROCEDURE — 85379 FIBRIN DEGRADATION QUANT: CPT

## 2023-03-06 PROCEDURE — 85025 COMPLETE CBC W/AUTO DIFF WBC: CPT

## 2023-03-06 PROCEDURE — 83735 ASSAY OF MAGNESIUM: CPT

## 2023-03-06 PROCEDURE — 99285 EMERGENCY DEPT VISIT HI MDM: CPT

## 2023-03-06 RX ORDER — MECLIZINE HCL 12.5 MG 12.5 MG/1
12.5 TABLET ORAL
Status: COMPLETED | OUTPATIENT
Start: 2023-03-06 | End: 2023-03-06

## 2023-03-06 RX ORDER — MECLIZINE HYDROCHLORIDE 25 MG/1
25 TABLET ORAL
Qty: 20 TABLET | Refills: 0 | Status: SHIPPED | OUTPATIENT
Start: 2023-03-06

## 2023-03-06 RX ORDER — ONDANSETRON 4 MG/1
4 TABLET, ORALLY DISINTEGRATING ORAL
Qty: 12 TABLET | Refills: 0 | Status: SHIPPED | OUTPATIENT
Start: 2023-03-06

## 2023-03-06 RX ORDER — ONDANSETRON 2 MG/ML
4 INJECTION INTRAMUSCULAR; INTRAVENOUS
Status: COMPLETED | OUTPATIENT
Start: 2023-03-06 | End: 2023-03-06

## 2023-03-06 RX ADMIN — MECLIZINE 12.5 MG: 12.5 TABLET ORAL at 10:08

## 2023-03-06 RX ADMIN — SODIUM CHLORIDE 1000 ML: 9 INJECTION, SOLUTION INTRAVENOUS at 10:07

## 2023-03-06 RX ADMIN — IOPAMIDOL 100 ML: 755 INJECTION, SOLUTION INTRAVENOUS at 11:00

## 2023-03-06 RX ADMIN — ONDANSETRON 4 MG: 2 INJECTION INTRAMUSCULAR; INTRAVENOUS at 10:08

## 2023-03-06 NOTE — DISCHARGE INSTRUCTIONS
- Increase fluid intake and continue meclizine at home as needed for vertigo, also try the Epley maneuver at home as discussed.   - Follow up with ENT and with cardiology as discussed to be reevaluated as soon as possible  - Return immediately to the ER for recurrent or worsening chest pain, difficulty breathing, abdominal pain, back pain, worsening dizziness, vision loss, neck pain, vomiting, difficulty walking, episodes of passing out, or any new or worsening symptoms

## 2023-03-06 NOTE — ED NOTES
PT states she is lightheaded has right jaw pain going down her right neck, heart palpations, shortness of breath and nausea that started at 5am when she woke up to go to the restroom. She states it comes in waves. Denies chest pain. Hx of breast cancer, diabetes. She states her symptoms are worse when she is sitting up vs laying down.
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
## 2023-03-07 LAB
ATRIAL RATE: 56 BPM
CALCULATED P AXIS, ECG09: 57 DEGREES
CALCULATED R AXIS, ECG10: 13 DEGREES
CALCULATED T AXIS, ECG11: 36 DEGREES
DIAGNOSIS, 93000: NORMAL
P-R INTERVAL, ECG05: 146 MS
Q-T INTERVAL, ECG07: 432 MS
QRS DURATION, ECG06: 92 MS
QTC CALCULATION (BEZET), ECG08: 416 MS
VENTRICULAR RATE, ECG03: 56 BPM

## 2023-03-08 ENCOUNTER — HOSPITAL ENCOUNTER (OUTPATIENT)
Dept: PHYSICAL THERAPY | Age: 58
Discharge: HOME | End: 2023-03-08
Payer: MEDICARE

## 2023-03-08 PROCEDURE — 97110 THERAPEUTIC EXERCISES: CPT

## 2023-03-08 PROCEDURE — 97140 MANUAL THERAPY 1/> REGIONS: CPT

## 2023-03-09 ENCOUNTER — HOSPITAL ENCOUNTER (OUTPATIENT)
Dept: MRI IMAGING | Age: 58
End: 2023-03-09
Attending: ORTHOPAEDIC SURGERY
Payer: MEDICARE

## 2023-03-09 ENCOUNTER — HOSPITAL ENCOUNTER (OUTPATIENT)
Dept: MRI IMAGING | Age: 58
Discharge: HOME | End: 2023-03-09
Attending: ORTHOPAEDIC SURGERY
Payer: MEDICARE

## 2023-03-09 VITALS — BODY MASS INDEX: 33.47 KG/M2 | WEIGHT: 195 LBS

## 2023-03-09 DIAGNOSIS — M54.32 LEFT SIDED SCIATICA: ICD-10-CM

## 2023-03-09 DIAGNOSIS — M54.6 PAIN IN THORACIC SPINE: ICD-10-CM

## 2023-03-09 DIAGNOSIS — Z98.890 HISTORY OF LUMBAR LAMINECTOMY: ICD-10-CM

## 2023-03-09 DIAGNOSIS — M54.16 RADICULOPATHY OF LUMBAR REGION: ICD-10-CM

## 2023-03-09 DIAGNOSIS — M47.812 CERVICAL SPONDYLOSIS WITHOUT MYELOPATHY: ICD-10-CM

## 2023-03-09 DIAGNOSIS — M47.816 LUMBAR SPONDYLOSIS: ICD-10-CM

## 2023-03-09 DIAGNOSIS — M50.30 DDD (DEGENERATIVE DISC DISEASE), CERVICAL: ICD-10-CM

## 2023-03-09 DIAGNOSIS — M54.2 CERVICALGIA: ICD-10-CM

## 2023-03-09 DIAGNOSIS — M51.36 DDD (DEGENERATIVE DISC DISEASE), LUMBAR: ICD-10-CM

## 2023-03-09 DIAGNOSIS — M54.50 LUMBAR PAIN: ICD-10-CM

## 2023-03-09 DIAGNOSIS — M47.814 THORACIC SPONDYLOSIS WITHOUT MYELOPATHY: ICD-10-CM

## 2023-03-09 DIAGNOSIS — M51.34 DDD (DEGENERATIVE DISC DISEASE), THORACIC: ICD-10-CM

## 2023-03-09 PROCEDURE — 74011250636 HC RX REV CODE- 250/636: Performed by: ORTHOPAEDIC SURGERY

## 2023-03-09 PROCEDURE — 72146 MRI CHEST SPINE W/O DYE: CPT

## 2023-03-09 PROCEDURE — A9576 INJ PROHANCE MULTIPACK: HCPCS | Performed by: ORTHOPAEDIC SURGERY

## 2023-03-09 PROCEDURE — 72158 MRI LUMBAR SPINE W/O & W/DYE: CPT

## 2023-03-09 PROCEDURE — 72141 MRI NECK SPINE W/O DYE: CPT

## 2023-03-09 RX ADMIN — GADOTERIDOL 18 ML: 279.3 INJECTION, SOLUTION INTRAVENOUS at 10:52

## 2023-03-09 NOTE — PROGRESS NOTES
LYMPHEDEMA PT DAILY TREATMENT NOTE - [facility] -15    Patient Name: Bay Madrigal  Date:3/08/2023  : 1965  [x]  Patient  Verified  Payor: Yue Dunn / Plan: [address] [place] [address] HMO / Product Type: Managed Care Medicare /    In time:12:15 pm  Out time:1:45pm  Total Treatment Time (min): 90  Total Timed Codes (min): 90  1:1 Treatment Time ([facility] only): 90   Visit #:  2    Treatment Area: Lymphedema, not elsewhere classified [I89.0]    SUBJECTIVE  Pain Level (0-10 scale): 2/10   Any medication changes, allergies to medications, adverse drug reactions, diagnosis change, or new procedure performed?: [] No    [x] Yes (see summary sheet for update) Patient went to ED on 3/6/2023 secondary to lightheadedness. Patient diagnosed with vertigo and given and prescribed Meclizine and she has improved. Subjective functional status/changes:   [x] No changes reported  Patient reports that she is eager to be measured for compression products to help her manage her swelling. Patient helps with her grandchildren on a daily basis and wants to be as active as possible. Patient reports that she may be starting outpatient PT at [address] [place] [address] soon as well. Discussed making sure she contacts insurance provider to make aware as well as not to schedule lymphedema clinic visits and OP PT visits on the same day. OBJECTIVE  8 min Therapeutic Exercise:  [] Mary Jo Robbins Exercises [x] Remedial Lymphedema Exercise Program/Postural exercises [] Axillary Web Exercise Program      [] Shoulder ROM Exercises   Rationale: Activate muscle pump to improve lymphatic fluid movement and decrease swelling to improve the patients ability to perform ADL and IADL skills and prevent worsening of swelling over time.     82 min Manual Therapy    Kinesiotaping: Deferred     Manual Lymphatic Drainage (MLD):  Area to decongest: UE: bilateral LE: bilateral and trunk   Sequence used and effectiveness: Modifications were made to manual lymph drainage sequence to exclude cervical techniques secondary to patient's age. Secondary sequence for lower extremities with trunk involvement. Secondary sequence for upper extremity with trunk involvement. Palpated for cording on the R. Unable to palpate cords today. Skin/wound care/debridement: Reviewed skin care principles:   Skin care products  Hygiene  Prevention of cellulitis   Applied multi-layer compression bandaging to: Deferred   Upper/Lower Extremity Compression: Measured for the following compression products for initial order. The final measurements will be sent once vendor is able to determine what items will be approved.:    UE: bilateral LE: bilateral bilateral upper extremity: Arm sleeve, Glove with open fingers, Top band silicone border, and Other: night time garments and bilateral lower extremity: Knee high [place] Night garment    Style: Flat-knit and Foam based    Brand: [employer]    Type: Custom:      Vendor: [employer]    Education: Educated patient in compression garment donning and doffing. Glove use with donning. Daily wear schedule. Daily laundering. Garment lifespan. Return and reordering process (by bringing prior garments into the clinic). Will need to educate patient further on garments once she receives them. Rationale: decrease edema  to improve the patients ability to prevent worsening swelling over time. With   [x] TE   [] TA   [x] MT   [] SC   [] other: Patient Education: [x] Review HEP    [x] MLD Patient Education Continued education in self MLD technique with bathing and skin care. [x] Progressed/Changed HEP based on:   [x] positioning   [] Kinesiotape   [x] Skin care   [] wound care   [x] other: compression garment ordering/process  []   Patient / caregiver re-demonstrated bandaging.  [] Yes  [] No  Compression bandaging/garment precautions reviewed: [] Yes  [] No     Other Objective/Functional Measures:   Deferred full measurements for measuring for compression garment order to begin process. Will reassess full volumes on an upcoming visit. Pain Level (0-10 scale) post treatment: 2/10    ASSESSMENT/Changes in Function:   Patient seen today to focus on starting compression garment order as this takes increased time for approval and she is eager to get products that can assist her with managing her swelling. Patient will return next week for follow up to continue to focus on education of skin care, exercise, MLD and compression. Patient will continue to benefit from skilled PT services to  address ROM deficits, address swelling, analyze compression product fit and use, instruct in home lymphedema management program, and measure for compression products to attain remaining goals. []  See Plan of Care  []  See progress note/recertification  []  See Discharge Summary         Progress towards goals / Updated goals:  Short Term Goals: To be accomplished in 8 treatments:  1. Patient/Caregiver will demonstrate knowledge of signs/symptoms of infections/cellulitis and be independent in skin care to prevent cellulitis. In progress  2. Patient/Caregiver will demonstrate independence in lymphedema home program of therapeutic exercises to improve circulation and decongest UE to improve ADLs. In progress  3. Patient/Caregiver will demonstrate independence in lymphedema home program of therapeutic exercises to improve circulation and decongest UE to improve ADLs. In progress  4. Patient will be measured for appropriate bilateral lower compression garments to prevent worsening of swelling over time. In progress. Measures and will send initial garment order request for approval to vendor. 5.  Patient will be measured for bilateral upper extremity compression garments to prevent worsening of swelling over time. In progress. Measures and will send initial garment order request for approval to vendor. Long Term Goals: To be accomplished in 16 treatments:  1. Patient/Caregiver will show improvement in the Lymphedema Life Impact Scale by decreasing the score to 49% and thus allow improvement in patient's quality of life. In progress  2. Patient/Caregiver will be independent with don/doff of compression system for upper and lower extremities and independent in use in order to prevent reaccumulation of fluid at discharge. In progress  3. Increase right shoulder internal rotation active range of motion to 0-70 degrees and flexion to 0-170 degrees to improve self care activities and allow patient to bathe her back without limitation. In progress  3. Patient/Caregiver will be independent in home lymphedema management. In progress  4. Patient/Caregiver will demonstrate stable or reduced limb volumes to indicate appropriateness for restorative phase of care.   In progress     PLAN  []  Upgrade activities as tolerated     [x]  Continue plan of care  []  Update interventions per flow sheet       []  Discharge due to:_  []  Other:_      Case GOODEN Patch, PTA, CLT 3/08/2023

## 2023-03-13 ENCOUNTER — OFFICE VISIT (OUTPATIENT)
Dept: INTERNAL MEDICINE CLINIC | Age: 58
End: 2023-03-13
Payer: MEDICARE

## 2023-03-13 VITALS
SYSTOLIC BLOOD PRESSURE: 124 MMHG | HEIGHT: 64 IN | TEMPERATURE: 98 F | OXYGEN SATURATION: 99 % | BODY MASS INDEX: 33.29 KG/M2 | WEIGHT: 195 LBS | DIASTOLIC BLOOD PRESSURE: 70 MMHG | HEART RATE: 70 BPM | RESPIRATION RATE: 16 BRPM

## 2023-03-13 DIAGNOSIS — I10 ESSENTIAL HYPERTENSION: ICD-10-CM

## 2023-03-13 DIAGNOSIS — E88.81 INSULIN RESISTANCE: Primary | ICD-10-CM

## 2023-03-13 DIAGNOSIS — I89.0 SECONDARY LYMPHEDEMA: ICD-10-CM

## 2023-03-13 DIAGNOSIS — E78.00 HIGH CHOLESTEROL: ICD-10-CM

## 2023-03-13 PROBLEM — E11.9 TYPE 2 DIABETES MELLITUS (HCC): Status: RESOLVED | Noted: 2023-01-17 | Resolved: 2023-03-13

## 2023-03-13 PROBLEM — I50.30 HEART FAILURE WITH PRESERVED LEFT VENTRICULAR FUNCTION (HFPEF) (HCC): Status: RESOLVED | Noted: 2022-05-20 | Resolved: 2023-03-13

## 2023-03-13 PROCEDURE — 3017F COLORECTAL CA SCREEN DOC REV: CPT | Performed by: INTERNAL MEDICINE

## 2023-03-13 PROCEDURE — 99214 OFFICE O/P EST MOD 30 MIN: CPT | Performed by: INTERNAL MEDICINE

## 2023-03-13 PROCEDURE — G8417 CALC BMI ABV UP PARAM F/U: HCPCS | Performed by: INTERNAL MEDICINE

## 2023-03-13 PROCEDURE — G8427 DOCREV CUR MEDS BY ELIG CLIN: HCPCS | Performed by: INTERNAL MEDICINE

## 2023-03-13 PROCEDURE — G9717 DOC PT DX DEP/BP F/U NT REQ: HCPCS | Performed by: INTERNAL MEDICINE

## 2023-03-13 NOTE — PROGRESS NOTES
Health Maintenance Due   Topic Date Due    Pneumococcal 0-64 years (1 - PCV) Never done    Foot Exam Q1  Never done    Eye Exam Retinal or Dilated  Never done    Diabetic Alb to Cr ratio (uACR) test  Never done    Hepatitis B Vaccine (1 of 3 - Risk 3-dose series) Never done    Shingles Vaccine (1 of 2) Never done    COVID-19 Vaccine (2 - Pfizer series) 11/12/2021    Flu Vaccine (1) Never done    Colorectal Cancer Screening Combo  02/06/2023       Chief Complaint   Patient presents with    Diabetes    GERD    Follow Up Chronic Condition       1. Have you been to the ER, urgent care clinic since your last visit? Hospitalized since your last visit? YES MARCH 6TH, DIZZINESS, VERTIGO     2. Have you seen or consulted any other health care providers outside of the [address] [place] [address] since your last visit? Include any pap smears or colon screening. No    3) Do you have an Advance Directive on file? no    4) Are you interested in receiving information on Advance Directives? NO      Patient is accompanied by self I have received verbal consent from Halrey Julien to discuss any/all medical information while they are present in the room.
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
## 2023-03-14 ENCOUNTER — HOSPITAL ENCOUNTER (OUTPATIENT)
Dept: PHYSICAL THERAPY | Age: 58
Discharge: HOME | End: 2023-03-14
Payer: MEDICARE

## 2023-03-14 VITALS — WEIGHT: 198 LBS | BODY MASS INDEX: 35.08 KG/M2 | HEIGHT: 63 IN

## 2023-03-14 LAB
ALBUMIN SERPL-MCNC: 3.9 G/DL (ref 3.8–4.9)
ALBUMIN/GLOB SERPL: 1.3 {RATIO} (ref 1.2–2.2)
ALP SERPL-CCNC: 97 IU/L (ref 44–121)
ALT SERPL-CCNC: 12 IU/L (ref 0–32)
AST SERPL-CCNC: 17 IU/L (ref 0–40)
BILIRUB SERPL-MCNC: <0.2 MG/DL (ref 0–1.2)
BUN SERPL-MCNC: 16 MG/DL (ref 6–24)
BUN/CREAT SERPL: 19 (ref 9–23)
CALCIUM SERPL-MCNC: 10.1 MG/DL (ref 8.7–10.2)
CHLORIDE SERPL-SCNC: 103 MMOL/L (ref 96–106)
CHOLEST SERPL-MCNC: 175 MG/DL (ref 100–199)
CO2 SERPL-SCNC: 26 MMOL/L (ref 20–29)
CREAT SERPL-MCNC: 0.86 MG/DL (ref 0.57–1)
EGFRCR SERPLBLD CKD-EPI 2021: 78 ML/MIN/1.73
GLOBULIN SER CALC-MCNC: 3 G/DL (ref 1.5–4.5)
GLUCOSE SERPL-MCNC: 81 MG/DL (ref 70–99)
HDLC SERPL-MCNC: 65 MG/DL
IMP & REVIEW OF LAB RESULTS: NORMAL
LDLC SERPL CALC-MCNC: 88 MG/DL (ref 0–99)
POTASSIUM SERPL-SCNC: 4.9 MMOL/L (ref 3.5–5.2)
PROT SERPL-MCNC: 6.9 G/DL (ref 6–8.5)
SODIUM SERPL-SCNC: 140 MMOL/L (ref 134–144)
TRIGL SERPL-MCNC: 127 MG/DL (ref 0–149)
VLDLC SERPL CALC-MCNC: 22 MG/DL (ref 5–40)

## 2023-03-14 PROCEDURE — 97140 MANUAL THERAPY 1/> REGIONS: CPT

## 2023-03-14 NOTE — PROGRESS NOTES
LYMPHEDEMA PT DAILY TREATMENT NOTE - [facility] 2-15    Patient Name: Sharmila Hopkins  Date:3/14/2023  : 1965  [x]  Patient  Verified  Payor: Angie Harmon / Plan: [address] [place] [address] HMO / Product Type: Managed Care Medicare /    In time:  Out time:1:45pm  Total Treatment Time (min): 55  Total Timed Codes (min): 55  1:1 Treatment Time ( [address] only): 54  Visit #:  3    Treatment Area: Lymphedema, not elsewhere classified [I89.0]    SUBJECTIVE  Pain Level (0-10 scale): 0/10   Any medication changes, allergies to medications, adverse drug reactions, diagnosis change, or new procedure performed?: [] No    [x] Yes (see summary sheet for update)   Subjective functional status/changes:   [x] No changes reported  Patient reports that her vertigo is better. She has had EMG and MRI since last visit and she also went for her evaluation at [address] [place] [address] PT. They will be focusing on her low back to start with and then begin treatment on her neck. She reports she called insurance company and they said that she can see both therapies as we are addressing different medical issues. Patient reports that she need to leave session early today because she has another medical appointment scheduled and needed to leave to travel across [facility] to get there on time. OBJECTIVE  55 min Manual Therapy    Kinesiotaping: Deferred     Manual Lymphatic Drainage (MLD):  Area to decongest: UE: bilateral LE: bilateral and trunk   Sequence used and effectiveness: Modifications were made to manual lymph drainage sequence to exclude cervical techniques secondary to patient's age. Secondary sequence for lower extremities with trunk involvement. Secondary sequence for upper extremity with trunk involvement. Focused due time constraints on teaching patient the trunk and B LE Self MLD packet today. Gave written copy and will need to go over techniques on upcoming visit again during MLD.  Patient is most concerned with condition of her lower legs so started with this as focus. Will add UE Self MLD on future visits. Discussed modifying techniques if she has difficulty reaching by utilizing long handled sponge etc.    Skin/wound care/debridement: Reviewed skin care principles:   Skin care products  Hygiene  Prevention of cellulitis   Applied multi-layer compression bandaging to: Deferred   Upper/Lower Extremity Compression: Measured for the following compression products for initial order on 3/08/2023. The final measurements will be sent once vendor is able to determine what items will be approved.:    UE: bilateral LE: bilateral bilateral upper extremity: Arm sleeve, Glove with open fingers, Top band silicone border, and Other: night time garments and bilateral lower extremity: Knee high Luzma Night garment vs Full leg garment     Style: Flat-knit and Foam based    Brand: [employer]    Type: Custom:      Vendor: [employer]    Education: Educated patient in compression garment donning and doffing. Glove use with donning. Daily wear schedule. Daily laundering. Garment lifespan. Return and reordering process (by bringing prior garments into the clinic). Will need to educate patient further on garments once she receives them. Rationale: decrease edema  to improve the patients ability to prevent worsening swelling over time. With   [] TE   [] TA   [x] MT   [] SC   [] other: Patient Education: [x] Review HEP    [x] MLD Patient Education Reviewed with patient, as well as demonstration and instruction during MLD portion of the session. Continued education in self MLD technique with bathing and skin care. Provided patient with the written instructions to follow. [x] Progressed/Changed HEP based on:   [x] positioning   [] Kinesiotape   [x] Skin care   [] wound care   [x] other: compression garment ordering/process  []   Patient / caregiver re-demonstrated bandaging.  [] Yes  [] No  Compression bandaging/garment precautions reviewed: [] Yes  [] No     Other Objective/Functional Measures: Will reassess full volumes on an upcoming visit. Pain Level (0-10 scale) post treatment: 0/10    ASSESSMENT/Changes in Function:   Patient returned today for follow up, but not able to stay for full session due another medical appointment that she needed to attend @10:15 am. Focused on beginning Self MLD B LEs and patient able to demonstrate understanding and will begin working on techniques in the home setting. Patient reports that she has had MRI and EMG since last visit and had her evaluation with orthopedic PT and will begin treatments there as well. Patient's compression order has been send to vendor last visit to assist with determining what items will be covered as this may take some time. Patient is focused on improving her condition and learning home management techniques. Patient will return next week for follow up. Patient will continue to benefit from skilled PT services to  address ROM deficits, address swelling, analyze compression product fit and use, instruct in home lymphedema management program, and measure for compression products to attain remaining goals. []  See Plan of Care  []  See progress note/recertification  []  See Discharge Summary         Progress towards goals / Updated goals:  Short Term Goals: To be accomplished in 8 treatments:  1. Patient/Caregiver will demonstrate knowledge of signs/symptoms of infections/cellulitis and be independent in skin care to prevent cellulitis. In progress  2. Patient/Caregiver will demonstrate independence in lymphedema home program of therapeutic exercises to improve circulation and decongest UE to improve ADLs. In progress  3. Patient/Caregiver will demonstrate independence in lymphedema home program of therapeutic exercises to improve circulation and decongest UE to improve ADLs. In progress  4.    Patient will be measured for appropriate bilateral lower compression garments to prevent worsening of swelling over time. In progress. Order send to vendor 3/8/2023. Will need to update measurements once garments are approved. 5.  Patient will be measured for bilateral upper extremity compression garments to prevent worsening of swelling over time. In progress. Order send to vendor 3/8/2023. Will need to update measurements once garments are approved. Long Term Goals: To be accomplished in 16 treatments:  1. Patient/Caregiver will show improvement in the Lymphedema Life Impact Scale by decreasing the score to 49% and thus allow improvement in patient's quality of life. In progress  2. Patient/Caregiver will be independent with don/doff of compression system for upper and lower extremities and independent in use in order to prevent reaccumulation of fluid at discharge. In progress  3. Increase right shoulder internal rotation active range of motion to 0-70 degrees and flexion to 0-170 degrees to improve self care activities and allow patient to bathe her back without limitation. In progress  3. Patient/Caregiver will be independent in home lymphedema management. In progress  4. Patient/Caregiver will demonstrate stable or reduced limb volumes to indicate appropriateness for restorative phase of care. In progress     PLAN  []  Upgrade activities as tolerated     [x]  Continue plan of care  []  Update interventions per flow sheet       []  Discharge due to:_  [x]  Other: Will focus on reassessment of volumes,range of motion, MLD and continued addition of home program as time allows next visit.      Case Mackenzie, PTA, CLT 3/14/2023

## 2023-03-20 ENCOUNTER — HOSPITAL ENCOUNTER (OUTPATIENT)
Dept: PHYSICAL THERAPY | Age: 58
Discharge: HOME | End: 2023-03-20
Payer: MEDICARE

## 2023-03-20 NOTE — PROGRESS NOTES
[facility]ymphedema [facility]  a part of [address] [place] [address], [address] [facility], [address]  Phone: [phone]  Fax: [phone]        Progress Note    Name: Lyla Smith   : 1965   MD: Cecil Patel*       Treatment Diagnosis: Lymphedema, not elsewhere classified [I89.0]  Start of Care: 2023    Visits from Start of Care: 4  Missed Visits: 0    Summary of Care: measuring for compression garments, education in skin care, infection risk, and home exercise program, manual lymph drainage    Assessment / Recommendations:           Short Term Goals: To be accomplished in 8 treatments:  1. Patient/Caregiver will demonstrate knowledge of signs/symptoms of infections/cellulitis and be independent in skin care to prevent cellulitis. In progress  Status at last note/certification:Initiated  Status at progress note: not met/in progress     2. Patient/Caregiver will demonstrate independence in lymphedema home program of therapeutic exercises to improve circulation and decongest UE to improve ADLs. In progress  Status at last note/certification:Initiated  Status at progress note: not met/in progress     3. Patient/Caregiver will demonstrate independence in lymphedema home program of therapeutic exercises to improve circulation and decongest UE to improve ADLs. In progress  Status at last note/certification:Initiated  Status at progress note: not met/in progress     4. Patient will be measured for appropriate bilateral lower compression garments to prevent worsening of swelling over time. In progress. Order send to vendor 3/8/2023. Will need to update measurements once garments are approved. Status at last note/certification:Initiated  Status at progress note: not met/in progress     5. Patient will be measured for bilateral upper extremity compression garments to prevent worsening of swelling over time. In progress. Order send to vendor 3/8/2023.  Will need to update measurements once garments are approved. Status at last note/certification:Initiated  Status at progress note: not met/in progress        Long Term Goals: To be accomplished in 16 treatments:  1. Patient/Caregiver will show improvement in the Lymphedema Life Impact Scale by decreasing the score to 49% and thus allow improvement in patient's quality of life. In progress  Status at last note/certification:Initiated  Status at progress note: not met/in progress     2. Patient/Caregiver will be independent with don/doff of compression system for upper and lower extremities and independent in use in order to prevent reaccumulation of fluid at discharge. In progress  Status at last note/certification:Initiated  Status at progress note: not met/in progress     3. Increase right shoulder internal rotation active range of motion to 0-70 degrees and flexion to 0-170 degrees to improve self care activities and allow patient to bathe her back without limitation. In progress  Status at last note/certification:Initiated  Status at progress note: not met/in progress     4. Patient/Caregiver will be independent in home lymphedema management. In progress  Status at last note/certification:Initiated  Status at progress note: not met/in progress     5. Patient/Caregiver will demonstrate stable or reduced limb volumes to indicate appropriateness for restorative phase of care.   In progress      Status at last note/certification:Initiated  Status at progress note: not met/in progress     PLAN       Other: Continue with treatment as established on plan of care       JOHNNIE Bolden, KUSUM-VIKI  3/20/2023

## 2023-03-20 NOTE — PROGRESS NOTES
[facility]  Lymphedema Clinic  [facility] [place], [address], [address] THERAPY      3/20/2023:  Lena Mendoza was not seen on this date for physical therapy for the following reason(s):    Patient arrived for 9:00 treatment visit today but she was not seen for the visit secondary to she broke her tooth over the weekend. The dentist could see her this am at 10:00 otherwise she would have to wait another week to have the tooth addressed. She was only able to drink liquids since it happened. Her visit was on the other end of [facility] so advised her to take the appt and we cancelled her lymphedema treatment today. Patient will be seen again next week. She reports vendor called her about her garment order and it appears that her products have been approved. Progress note was not able to be completed today secondary to patient was unable to complete the treatment visit.     Pricilla Gosselin, MSPT, KUSUM-VIKI

## 2023-03-21 ENCOUNTER — OFFICE VISIT (OUTPATIENT)
Dept: SURGERY | Age: 58
End: 2023-03-21

## 2023-03-21 VITALS
HEIGHT: 63 IN | DIASTOLIC BLOOD PRESSURE: 79 MMHG | TEMPERATURE: 98.3 F | OXYGEN SATURATION: 98 % | WEIGHT: 196.2 LBS | SYSTOLIC BLOOD PRESSURE: 118 MMHG | HEART RATE: 60 BPM | BODY MASS INDEX: 34.76 KG/M2 | RESPIRATION RATE: 18 BRPM

## 2023-03-21 DIAGNOSIS — E66.09 CLASS 1 OBESITY DUE TO EXCESS CALORIES WITH SERIOUS COMORBIDITY IN ADULT, UNSPECIFIED BMI: Primary | ICD-10-CM

## 2023-03-21 DIAGNOSIS — R73.9 HYPERGLYCEMIA: ICD-10-CM

## 2023-03-21 DIAGNOSIS — G47.33 OSA ON CPAP: ICD-10-CM

## 2023-03-21 DIAGNOSIS — G47.00 INSOMNIA, UNSPECIFIED TYPE: ICD-10-CM

## 2023-03-21 DIAGNOSIS — Z99.89 OSA ON CPAP: ICD-10-CM

## 2023-03-21 DIAGNOSIS — E88.81 INSULIN RESISTANCE: ICD-10-CM

## 2023-03-21 PROBLEM — E11.9 TYPE 2 DIABETES MELLITUS (HCC): Status: ACTIVE | Noted: 2023-03-21

## 2023-03-21 RX ORDER — SEMAGLUTIDE 1.34 MG/ML
0.25 INJECTION, SOLUTION SUBCUTANEOUS
Qty: 1 BOX | Refills: 1 | Status: SHIPPED | OUTPATIENT
Start: 2023-03-21

## 2023-03-21 RX ORDER — NYSTATIN AND TRIAMCINOLONE ACETONIDE 100000; 1 [USP'U]/G; MG/G
CREAM TOPICAL AS NEEDED
COMMUNITY
Start: 2023-03-20

## 2023-03-21 NOTE — PROGRESS NOTES
[facility] Progress Note:   F/up Physician Visit    CC: Weight Management      Delmi Bourgeois is a 61 y.o. female who is here for her  f/up physician visit for the [facility] Program.     She is post GBP  Today 196 Feb 199          Weight Metrics 3/21/2023 3/21/2023 3/14/2023 3/13/2023 3/9/2023 3/6/2023 2/21/2023   Weight - 196 lb 3.2 oz 198 lb 195 lb 195 lb 191 lb -   Neck Circ (inches) 12.25 - - - - - 12.25   Waist Measure Inches 39.5 - - - - - 37   Body Fat % 41.2 - - - - - 42.6   BMI - 34.76 kg/m2 35.07 kg/m2 33.47 kg/m2 33.47 kg/m2 32.79 kg/m2 -         Outpatient Medications Marked as Taking for the 3/21/23 encounter (Office Visit) with Tian Espinoza MD   Medication Sig Dispense Refill    nystatin-triamcinolone (MYCOLOG II) topical cream Apply  to affected area as needed. semaglutide (Ozempic) 0.25 mg or 0.5 mg/dose (2 mg/1.5 ml) subq pen 0.25 mg by SubCUTAneous route every seven (7) days. 1 Box 1    meclizine (ANTIVERT) 25 mg tablet Take 1 Tablet by mouth three (3) times daily as needed for Dizziness. 20 Tablet 0    ondansetron (ZOFRAN ODT) 4 mg disintegrating tablet Take 1 Tablet by mouth every six (6) hours as needed for Nausea, Vomiting or Nausea or Vomiting. 12 Tablet 0    fluconazole (DIFLUCAN) 100 mg tablet Take 100 mg by mouth as needed. LORazepam (ATIVAN) 0.5 mg tablet Take 0.5 mg by mouth nightly. co-enzyme Q-10 (CO Q-10) 100 mg capsule Take 100 mg by mouth daily. LUTEIN PO Take 1 Tablet by mouth daily. bumetanide (BUMEX) 1 mg tablet Take 1 Tablet by mouth as needed (Leg swelling). Indications: visible water retention 90 Tablet 1    PreviDent 5000 Plus 1.1 % crea BRUSH 2 MINUTES WITH PEASIZED TOOTHPASTE AT BEDTIME AFTER REGULAR BRUSHING THEN SPIT OUT. NOT FOOD OR DRINK AFTERWARD      gabapentin (NEURONTIN) 300 mg capsule Take 300 mg by mouth as needed. metFORMIN ER (GLUCOPHAGE XR) 500 mg tablet Take 500 mg by mouth as needed.       cholecalciferol (VITAMIN D3) 25 mcg (1,000 unit) cap Take 1,000 Units by mouth daily. pregabalin (LYRICA) 150 mg capsule Take 150 mg by mouth as needed. anastrozole (ARIMIDEX) 1 mg tablet Take 1 mg by mouth daily. potassium 99 mg tablet Take 1 Tablet by mouth daily. 90 Tablet 0    acetaminophen (TYLENOL ARTHRITIS PO) Take 1 Tablet by mouth two (2) times daily as needed. cyanocobalamin (VITAMIN B12) 500 mcg tablet Take 500 mcg by mouth every morning. sucralfate (CARAFATE) 1 gram tablet Take 1 g by mouth daily as needed. Prn       linaclotide (LINZESS) 290 mcg cap capsule Take 290 mcg by mouth daily as needed. Participation   Did you attend clinic and class last week? no    Review of Systems  Since your last visit, have you experienced any complications? no  If yes, please list:       Are you taking an appetite suppressant? No, but taking ozempic which has the side effect of appetite control  If so, is there any Chest Pain, Palpitations or Dizziness? HUNGER CONTROL: good  Getting a little nause from the ozempic at 0.25 mg    BP Readings from Last 3 Encounters:   03/21/23 118/79   03/13/23 124/70   03/06/23 136/80       SLEEP:7 with medication-tylenol pm    Have you received any other medical care this week? no  If yes, where and for what? Have you discontinued or changed any medicine or dose of your medicine since your last visit with Dr Meli Mathias? no  If yes, where and for what? Diet  How many ounces of calorie-free liquids did you consume each day?  50s oz    How many meal replacements did you take each day? 4    Did you have any problems adhering to the program?  no If yes, please explain:      Exercise  Aerobic exercise: gym 3 days a week TM and is doing some resistance each day of the TM workout min  Resistance exercise:  workouts / week  Any discomfort? If yes, where?       Objective  Visit Vitals  /79 (BP 1 Location: Right arm, BP Patient Position: Sitting, BP Cuff Size: Adult)   Pulse 60   Temp 98.3 °F (36.8 °C) (Oral)   Resp 18   Ht 5' 3\" (1.6 m)   Wt 196 lb 3.2 oz (89 kg)   SpO2 98%   BMI 34.76 kg/m²     No LMP recorded. Patient has had a hysterectomy. Physical Exam  Appearance: well,   Mental:A&O x 3, NAD  H:NC/AT,  EENT:   EOMI, PERRL, No scleral icterus  Neck: no bruit or JVD  Lung: clear, No W/R  ABD: soft, active, nontender  Ext:  no Edema  Neuro: nonfocal  Assessment / Plan    Encounter Diagnoses   Name Primary? Class 1 obesity due to excess calories with serious comorbidity in adult, unspecified BMI Yes    Hyperglycemia     Insulin resistance     Insomnia, unspecified type     KEVIN on CPAP      Diagnoses and all orders for this visit:    1. Class 1 obesity due to excess calories with serious comorbidity in adult, unspecified BMI    Continue 4 meal replacements   She had weight gaine this time  What she is reporting does not correlate with the weight gain  She may be doing more snacking that is mindless  She needs more time with the dietitian    2. Hyperglycemia  -     semaglutide (Ozempic) 0.25 mg or 0.5 mg/dose (2 mg/1.5 ml) subq pen; 0.25 mg by SubCUTAneous route every seven (7) days. 3. Insulin resistance  -     semaglutide (Ozempic) 0.25 mg or 0.5 mg/dose (2 mg/1.5 ml) subq pen; 0.25 mg by SubCUTAneous route every seven (7) days. 4. Insomnia, unspecified type  The tylenol pm is working  5. KEVIN on CPAP  Using cpap  1. Weight management improved   Progress was reviewed with patient    2.   Labs    Latest results reviewed with patient       face to face time with Misael Porras consisted of counseling & coordinating and/or discussing treatment plans in reference to her obesitySandlynnette Robbins (: 1965) is a 61 y.o. female, established patient, here for evaluation of the following chief complaint(s):   Weight Management

## 2023-03-21 NOTE — PROGRESS NOTES
Weight Management. 1 month follow up. 1. Have you been to the ER, urgent care clinic since your last visit? Hospitalized since your last visit? Yes, [facility] for vertigo    2. Have you seen or consulted any other health care providers outside of the [address] [place] [address] since your last visit? Include any pap smears or colon screening.  Dentist yesterday    BMI - 34.4

## 2023-03-27 ENCOUNTER — HOSPITAL ENCOUNTER (OUTPATIENT)
Dept: PHYSICAL THERAPY | Age: 58
Discharge: HOME | End: 2023-03-27
Payer: MEDICARE

## 2023-03-27 PROCEDURE — 97110 THERAPEUTIC EXERCISES: CPT

## 2023-03-27 PROCEDURE — 97140 MANUAL THERAPY 1/> REGIONS: CPT

## 2023-03-30 NOTE — PROGRESS NOTES
[facility]  a part of [address] [place] [address] [address] Anderson, [address] [facility], [address]  Phone: [phone]  Fax: [phone]           Progress Note     Name: Marylene Ricker   : 1965            MD: Chente Doyle*     Treatment Diagnosis: Lymphedema, not elsewhere classified [I89.0]  Start of Care: 2023                      Visits from Start of Care: 4  Missed Visits: 1 (arrived and then held visit due to dental emergency 3/20/2023)     Summary of Care: measuring for compression garments, education in skin care, infection risk, and home exercise program, manual lymph drainage     Assessment / Recommendations:    Patient returned today for follow up for continued education in her home program and reassessing measurements for her compression garments. Focused on MLD/Self MLD of B LEs last visit and today added UE MLD/ Self MLD and exercises for B LEs. Patient is also being treated in outpatient PT for back issues. Patient's compression order is in process and will be updated until finalized with vendor. Patient is focused on improving her condition and learning home management techniques and is hopeful to receive her compression garments. Patient will return next week for follow up and progression towards goals. Patient will continue to benefit from skilled PT services to  address ROM deficits, address swelling, analyze compression product fit and use, instruct in home lymphedema management program, and measure for compression products to attain remaining goals. Short Term Goals: To be accomplished in 8 treatments:  1. Patient/Caregiver will demonstrate knowledge of signs/symptoms of infections/cellulitis and be independent in skin care to prevent cellulitis. Status at last note/certification:Initiated  Status at progress note: met     2.   Patient/Caregiver will demonstrate independence in lymphedema home program of therapeutic exercises to improve circulation and decongest UE to improve ADLs  Status at last note/certification:Initiated  Status at progress note: not met/in progress     3. Patient/Caregiver will demonstrate independence in lymphedema home program of therapeutic exercises to improve circulation and decongest UE to improve ADLs. Status at last note/certification:Initiated  Status at progress note: not met/in progress     4. Patient will be measured for appropriate bilateral lower compression garments to prevent worsening of swelling over time. Status at last note/certification:Initiated  Status at progress note: met     5. Patient will be measured for bilateral upper extremity compression garments to prevent worsening of swelling over time. In progress. Order send to vendor 3/8/2023. Will need to update measurements once garments are approved. Status at last note/certification:Initiated  Status at progress note: met        Long Term Goals: To be accomplished in 16 treatments:  1. Patient/Caregiver will show improvement in the Lymphedema Life Impact Scale by decreasing the score to 49% and thus allow improvement in patient's quality of life. In progress  Status at last note/certification:Initiated  Status at progress note: not met/in progress     2. Patient/Caregiver will be independent with don/doff of compression system for upper and lower extremities and independent in use in order to prevent reaccumulation of fluid at discharge. In progress  Status at last note/certification:Initiated  Status at progress note: not met/in progress     3. Increase right shoulder internal rotation active range of motion to 0-70 degrees and flexion to 0-170 degrees to improve self care activities and allow patient to bathe her back without limitation. In progress  Status at last note/certification:Initiated  Status at progress note: not met/in progress     4. Patient/Caregiver will be independent in home lymphedema management. In progress  Status at last note/certification:Initiated  Status at progress note: not met/in progress     5. Patient/Caregiver will demonstrate stable or reduced limb volumes to indicate appropriateness for restorative phase of care.   In progress       Status at last note/certification:Initiated  Status at progress note: not met/in progress     PLAN        Other: Continue with treatment as established on plan of care      Case Mackenzie, PTA, CLT 3/27/2027  Nikolay Gomez, MSPT, CLT-VIKI

## 2023-03-30 NOTE — PROGRESS NOTES
LYMPHEDEMA PT DAILY TREATMENT NOTE - [facility] 2-[facility]    Patient Name: Marylene Ricker  Date:3/27/2023  : 1965  [x]  Patient  Verified  Payor: Aundrea Nicholsl / Plan: [address] [place] [address] HMO / Product Type: Managed Care Medicare /    In time:  8:45 am Out time:10:15 am  Total Treatment Time (min):90  Total Timed Codes (min): 90  1:1 Treatment Time ( [address] only): 90  Visit #:  4    Treatment Area: Lymphedema, not elsewhere classified [I89.0]    SUBJECTIVE  Pain Level (0-10 scale): 0/10   Any medication changes, allergies to medications, adverse drug reactions, diagnosis change, or new procedure performed?: [] No    [x] Yes (see summary sheet for update)   Subjective functional status/changes:   [x] No changes reported  Patient arrived last week for her visit, but had a broken tooth and was able to get an emergency dental appointment around the time of her appointment in the clinic so treatment was held so that she could get to dental appointment. Patient reports that she is feeling much better today. OBJECTIVE  8 min Therapeutic Exercise:  [x] Mireille [place] Exercises [x] Remedial Lymphedema Exercise Program [] Axillary Web Exercise Program      [] Shoulder ROM Exercises for B LEs in seated position. Patient aware to modify as needed and not to perform exercises that cause pain. Rationale: Activate muscle pump to improve lymphatic fluid movement and decrease swelling to improve the patients ability to perform ADL and IADL skills and prevent worsening of swelling over time. 82 min Manual Therapy    Kinesiotaping: Deferred     Manual Lymphatic Drainage (MLD):  Area to decongest: UE: bilateral LE: bilateral and trunk   Sequence used and effectiveness: Modifications were made to manual lymph drainage sequence to exclude cervical techniques secondary to patient's age. Secondary sequence for lower extremities with trunk involvement. Secondary sequence for upper extremity with trunk involvement.   Patient has worked on her B LE self MLD packet since last visit. Today added the B UE Self MLD packet and demonstrated and educated on techniques. Patient has a vasopneumatic device and she was encouraged to perform Self MLD prior to use for improved results. Patient now with written copy of both packets for self MLD. Discussed modifying techniques if she has difficulty reaching by utilizing long handled sponge etc.    Skin/wound care/debridement: Reviewed skin care principles:   Skin care products  Hygiene  Prevention of cellulitis   Applied multi-layer compression bandaging to: Deferred   Upper/Lower Extremity Compression: Measured for the following compression products for initial order on 3/08/2023. The final measurements will be sent and updated as needed until final authorization has been obtained. Reassessed UE/LE/trunk garment measurements today to work on compression garment order now that she decided on all products for day and night. UE: bilateral LE: bilateral bilateral upper extremity: Arm sleeve, Glove with open fingers, Top band silicone border, and Other: night time garments and bilateral lower extremity: Knee high Luzma Night garment     Style: Flat-knit and Foam based    Brand: [employer]    Type: Custom:      Vendor: [employer]    Education: Educated patient in compression garment donning and doffing. Glove use with donning. Daily wear schedule. Daily laundering. Garment lifespan. Return and reordering process (by bringing prior garments into the clinic). Will need to educate patient further on garments once she receives them. Rationale: decrease edema  to improve the patients ability to prevent worsening swelling over time. With   [] TE   [] TA   [x] MT   [] SC   [] other: Patient Education: [x] Review HEP    [x] MLD Patient Education Reviewed with patient, as well as demonstration and instruction during MLD portion of the session.    Continued education in self MLD technique with bathing and skin care. Provided patient with the written instructions to follow. [x] Progressed/Changed HEP based on:   [x] positioning   [] Kinesiotape   [x] Skin care   [] wound care   [x] other: compression garment ordering/process  []   Patient / caregiver re-demonstrated bandaging. [] Yes  [] No  Compression bandaging/garment precautions reviewed: [] Yes  [] No     Other Objective/Functional Measures: Will reassess full volumes on an upcoming visit. Pain Level (0-10 scale) post treatment: 0/10    ASSESSMENT/Changes in Function:   Patient returned today for follow up for continued education in her home program and reassessing measurements for her compression garments. Focused on MLD/Self MLD of B LEs last visit and today added UE MLD/ Self MLD and exercises for B LEs. Patient is also being treated in outpatient PT for back issues. Patient's compression order is in process and will be updated until finalized with vendor. Patient is focused on improving her condition and learning home management techniques and is hopeful to receive her compression garments. Patient will return next week for follow up and progression towards goals. Patient will continue to benefit from skilled PT services to  address ROM deficits, address swelling, analyze compression product fit and use, instruct in home lymphedema management program, and measure for compression products to attain remaining goals. []  See Plan of Care  []  See progress note/recertification  []  See Discharge Summary         Progress towards goals / Updated goals:  Short Term Goals: To be accomplished in 8 treatments:  1. Patient/Caregiver will demonstrate knowledge of signs/symptoms of infections/cellulitis and be independent in skin care to prevent cellulitis. Goal Met 3/27/2023  2.   Patient/Caregiver will demonstrate independence in lymphedema home program of therapeutic exercises to improve circulation and decongest UE to improve ADLs.In progress  3. Patient/Caregiver will demonstrate independence in lymphedema home program of therapeutic exercises to improve circulation and decongest UE to improve ADLs. In progress  4. Patient will be measured for appropriate bilateral lower compression garments to prevent worsening of swelling over time. Goal Met 3/27/2023  5. Patient will be measured for bilateral upper extremity compression garments to prevent worsening of swelling over time. In progress. Goal Met 3/27/2023  Long Term Goals: To be accomplished in 16 treatments:  1. Patient/Caregiver will show improvement in the Lymphedema Life Impact Scale by decreasing the score to 49% and thus allow improvement in patient's quality of life. In progress  2. Patient/Caregiver will be independent with don/doff of compression system for upper and lower extremities and independent in use in order to prevent reaccumulation of fluid at discharge. In progress  3. Increase right shoulder internal rotation active range of motion to 0-70 degrees and flexion to 0-170 degrees to improve self care activities and allow patient to bathe her back without limitation. In progress  3. Patient/Caregiver will be independent in home lymphedema management. In progress  4. Patient/Caregiver will demonstrate stable or reduced limb volumes to indicate appropriateness for restorative phase of care. In progress     PLAN  []  Upgrade activities as tolerated     [x]  Continue plan of care  []  Update interventions per flow sheet       []  Discharge due to:_  [x]  Other: Will focus on reassessment of volumes,range of motion, MLD and continued addition of home program as time allows next visit next visit.      Case Mackenzie, PTA, CLT 3/27/2023  GUSTAVO JaraT, CLT-VIKI

## 2023-03-30 NOTE — PROGRESS NOTES
[facility]  a part of [address] [facility] [address]  [address] [place], [address] [facility], [facility], [address]  Phone: [phone]  Fax: [phone]           Progress Note     Name: Jack Hernandez   : 1965            MD: Tammy Tiwari*     Treatment Diagnosis: Lymphedema, not elsewhere classified [I89.0]  Start of Care: 2023                      Visits from Start of Care: 4  Missed Visits: 1 (arrived and then held visit due to dental emergency 3/20/2023)     Summary of Care: measuring for compression garments, education in skin care, infection risk, and home exercise program, manual lymph drainage     Assessment / Recommendations:    Patient returned today for follow up for continued education in her home program and reassessing measurements for her compression garments. Focused on MLD/Self MLD of B LEs last visit and today added UE MLD/ Self MLD and exercises for B LEs. Patient is also being treated in outpatient PT for back issues. Patient's compression order is in process and will be updated until finalized with vendor. Patient is focused on improving her condition and learning home management techniques and is hopeful to receive her compression garments. Patient will return next week for follow up and progression towards goals. Patient will continue to benefit from skilled PT services to  address ROM deficits, address swelling, analyze compression product fit and use, instruct in home lymphedema management program, and measure for compression products to attain remaining goals. Short Term Goals: To be accomplished in 8 treatments:  1. Patient/Caregiver will demonstrate knowledge of signs/symptoms of infections/cellulitis and be independent in skin care to prevent cellulitis. Status at last note/certification:Initiated  Status at progress note: met     2.   Patient/Caregiver will demonstrate independence in lymphedema home program of therapeutic exercises to improve circulation and decongest UE to improve ADLs  Status at last note/certification:Initiated  Status at progress note: not met/in progress     3. Patient/Caregiver will demonstrate independence in lymphedema home program of therapeutic exercises to improve circulation and decongest UE to improve ADLs. Status at last note/certification:Initiated  Status at progress note: not met/in progress     4. Patient will be measured for appropriate bilateral lower compression garments to prevent worsening of swelling over time. Status at last note/certification:Initiated  Status at progress note: met     5. Patient will be measured for bilateral upper extremity compression garments to prevent worsening of swelling over time. In progress. Order send to vendor 3/8/2023. Will need to update measurements once garments are approved. Status at last note/certification:Initiated  Status at progress note: met        Long Term Goals: To be accomplished in 16 treatments:  1. Patient/Caregiver will show improvement in the Lymphedema Life Impact Scale by decreasing the score to 49% and thus allow improvement in patient's quality of life. In progress  Status at last note/certification:Initiated  Status at progress note: not met/in progress     2. Patient/Caregiver will be independent with don/doff of compression system for upper and lower extremities and independent in use in order to prevent reaccumulation of fluid at discharge. In progress  Status at last note/certification:Initiated  Status at progress note: not met/in progress     3. Increase right shoulder internal rotation active range of motion to 0-70 degrees and flexion to 0-170 degrees to improve self care activities and allow patient to bathe her back without limitation. In progress  Status at last note/certification:Initiated  Status at progress note: not met/in progress     4. Patient/Caregiver will be independent in home lymphedema management. In progress  Status at last note/certification:Initiated  Status at progress note: not met/in progress     5. Patient/Caregiver will demonstrate stable or reduced limb volumes to indicate appropriateness for restorative phase of care.   In progress       Status at last note/certification:Initiated  Status at progress note: not met/in progress     PLAN       Other: Continue with treatment as established on plan of care     Case Mackenzie, PTA, CLT 3/27/2027

## 2023-04-04 ENCOUNTER — HOSPITAL ENCOUNTER (OUTPATIENT)
Dept: PHYSICAL THERAPY | Age: 58
End: 2023-04-04
Payer: MEDICARE

## 2023-04-04 PROCEDURE — 97140 MANUAL THERAPY 1/> REGIONS: CPT

## 2023-04-04 NOTE — PROGRESS NOTES
LYMPHEDEMA PT DAILY TREATMENT NOTE - [facility] 2-15     Patient Name: Solange Silverio  Date:2023  : 1965  [x]  Patient  Verified  Payor: Nury Andujar / Plan: [address] [place] [address] HMO / Product Type: Managed Care Medicare /    In time: 6250  Out time: 1200  Total Treatment Time (min): 90  Total Timed Codes (min):  90  1:1 Treatment Time ( only): 90  Visit #:  5     Treatment Area: Lymphedema, not elsewhere classified [I89.0]     SUBJECTIVE  Pain Level (0-10 scale): 0 out of 10 numeric scale in legs and  arms, still having chronic back pain  Any medication changes, allergies to medications, adverse drug reactions, diagnosis change, or new procedure performed?: [] No    [x] Yes (see summary sheet for update)   Subjective functional status/changes:   [x] No changes reported  I have been doing my home program and I am doing manual lymph drainage at night. I am feeling better and these techniques do help. OBJECTIVE     90  min Manual Therapy     Kinesiotaping: Deferred      Manual Lymphatic Drainage (MLD):  Area to decongest: UE: bilateral LE: bilateral and trunk   Sequence used and effectiveness: Modifications were made to manual lymph drainage sequence to exclude cervical techniques secondary to patient's age. Secondary sequence for lower extremities with trunk involvement. Secondary sequence for upper extremity with trunk involvement. Reviewed upper extremity techniques with additional techniques added to home program for fingers and hand, recommended 7- 10 repetitions versus 5- 7 on handout for home program.  Patient redemonstrated upper extremity techniques with good performance and independent. Educated in trunk manual lymph drainage techniques to address abdominal swelling and worked with stationary circles on abdomen on right and left side from midline to corresponding inguinal axillary anastomosis but worked only to lower ribs. Educated in deep technique for lateral trunk.  Patient had good performance of trunk techniques today. Skin/wound care/debridement: Patient is independent with skin care   Applied multi-layer compression bandaging to: Deferred   Upper/Lower Extremity Compression: Previously measured for the following compression products for initial order on 3/08/2023. Additional trunk measurements were taken today as patient has made a decision on additional garment options. UE: bilateral LE: bilateral bilateral upper extremity: Arm sleeve, Glove with open fingers, Top band silicone border, and Other: night time garments and bilateral lower extremity: Knee high Luzma Night garment      Style: Flat-knit and Foam based     Brand: [employer]     Type: Custom:       Vendor: [employer]     Education:    Return and reordering process (by bringing prior garments into the clinic). Patient will be fitted for products upon delivery and educated in product use at that time. Rationale: decrease edema  to improve the patients ability to prevent worsening swelling over time. With   [] TE   [] TA   [x] MT   [] SC   [] other: Patient Education: [x] Review HEP    [x] MLD Patient Education Reviewed with patient, as well as demonstration and instruction during MLD portion of the session. Continued education in self MLD technique  Added additional techniques today. Provided patient with the written instructions to follow. [] Progressed/Changed HEP based on:   [] positioning   [] Kinesiotape   [x] Skin care   [] wound care   [x] other: compression garment ordering/process, garment options  []   Patient / caregiver re-demonstrated bandaging. [] Yes  [] No  Compression bandaging/garment precautions reviewed: [] Yes  [] No      Other Objective/Functional Measures:    Reviewed results of volumetric measurements taken on evaluation.   Upper extremities    -right upper 2,479.63 ml today compared to 2,587.41mL ml on 2/21/2023.     -left upper  2,339.47 ml today compared to 2,423.48mL  on 2/21/2023. Percent difference today is 5.99% as compared to 6.76% right larger than left on evaluation. Lower extremities  Reviewed results of volumetric measurements taken on evaluation. -right lower 14,998.82 ml today compared to   16,256.05mL ml on  2/21/2023. .     -left lower  14,886.28 ml today compared to 16,249.80mL  on  2/21/2023. Izabela Males Percent difference today is 0.76% as compared to :0.04% right larger than left on evaluation. Right shoulder flexion- 0- 145 degrees (was 0-155 degrees), right shoulder internal rotation 0- 70 degrees (was 45 on eval)     Pain Level (0-10 scale) post treatment: 0 out of 10 numeric scale    Lymphedema life impact scale scores 42 out of 68 with 62% impairment     ASSESSMENT/Changes in Function:   Patient returned today for follow up. Reassessed bilateral upper extremity and bilateral lower extremity limb volumes. All limbs present with decreases in volume today. Patient is performing her home program as instructed. Reviewed her home program for upper extremity and trunk manual lymph drainage and modified program to address abdominal swelling as well. She is independent with upper extremity techniques. She will benefit from review of lower extremity techniques and HEP next session. Patient's compression order is in progress. Patient will continue to benefit from skilled PT services to  address ROM deficits, address swelling, analyze compression product fit and use, instruct in home lymphedema management program, and measure for compression products to attain remaining goals. [x]  See Plan of Care  [x]  See progress note/recertification  []  See Discharge Summary         Progress towards goals / Updated goals:  Short Term Goals: To be accomplished in 8 treatments:  1.   Patient/Caregiver will demonstrate knowledge of signs/symptoms of infections/cellulitis and be independent in skin care to prevent cellulitis. Goal Met 3/27/2023  2. Patient/Caregiver will demonstrate independence in lymphedema home program of therapeutic exercises to improve circulation and decongest UE to improve ADLs. In progress  3. Patient/Caregiver will demonstrate independence in lymphedema home program of therapeutic exercises to improve circulation and decongest UE to improve ADLs. In progress  4. Patient will be measured for appropriate bilateral lower compression garments to prevent worsening of swelling over time. Goal Met 3/27/2023  5. Patient will be measured for bilateral upper extremity compression garments to prevent worsening of swelling over time. Goal Met 3/27/2023    Long Term Goals: To be accomplished in 16 treatments:  1. Patient/Caregiver will show improvement in the Lymphedema Life Impact Scale by decreasing the score to 49% and thus allow improvement in patient's quality of life. Scores 62% on assessment today. 2.  Patient/Caregiver will be independent with don/doff of compression system for upper and lower extremities and independent in use in order to prevent reaccumulation of fluid at discharge. In progress  3. Increase right shoulder internal rotation active range of motion to 0-70 degrees and flexion to 0-170 degrees to improve self care activities and allow patient to bathe her back without limitation. Met for internal rotation today, but flexion remains limited  3. Patient/Caregiver will be independent in home lymphedema management. In progress. Patient is independent with upper extremity self manual lymph drainage techniques. 4. Patient/Caregiver will demonstrate stable or reduced limb volumes to indicate appropriateness for restorative phase of care. Decreased swelling is noted in all 4 extremities today on assessment.      PLAN  []  Upgrade activities as tolerated     [x]  Continue plan of care  []  Update interventions per flow sheet       []  Discharge due to:_  [x]  Other: Will focus on MLD and continued review of home program including HEP.   Add right shoulder flexion range of motion stretch to home program.     JOHNNIE Pyle, [facility] - [facility]   4/4/2023

## 2023-04-20 ENCOUNTER — APPOINTMENT (OUTPATIENT)
Dept: PHYSICAL THERAPY | Age: 58
End: 2023-04-20
Payer: MEDICARE

## 2023-04-25 ENCOUNTER — OFFICE VISIT (OUTPATIENT)
Dept: SURGERY | Age: 58
End: 2023-04-25

## 2023-04-25 VITALS
HEIGHT: 63 IN | RESPIRATION RATE: 18 BRPM | WEIGHT: 195.4 LBS | SYSTOLIC BLOOD PRESSURE: 132 MMHG | DIASTOLIC BLOOD PRESSURE: 84 MMHG | OXYGEN SATURATION: 99 % | HEART RATE: 55 BPM | BODY MASS INDEX: 34.62 KG/M2 | TEMPERATURE: 98 F

## 2023-04-25 DIAGNOSIS — E88.81 INSULIN RESISTANCE: ICD-10-CM

## 2023-04-25 DIAGNOSIS — E66.09 CLASS 1 OBESITY DUE TO EXCESS CALORIES WITH SERIOUS COMORBIDITY IN ADULT, UNSPECIFIED BMI: Primary | ICD-10-CM

## 2023-04-25 DIAGNOSIS — R73.9 HYPERGLYCEMIA: ICD-10-CM

## 2023-04-25 RX ORDER — SEMAGLUTIDE 1.34 MG/ML
0.5 INJECTION, SOLUTION SUBCUTANEOUS
Qty: 1 BOX | Refills: 0 | Status: SHIPPED | OUTPATIENT
Start: 2023-04-25

## 2023-04-25 NOTE — PROGRESS NOTES
Identified pt with two pt identifiers (name and ). Reviewed chart in preparation for visit and have obtained necessary documentation. Myla Storey is a 61 y.o. female  Chief Complaint   Patient presents with    Weight Management   1 month follow up. Visit Vitals  /84 (BP 1 Location: Right arm, BP Patient Position: Sitting, BP Cuff Size: Large adult)   Pulse (!) 55   Temp 98 °F (36.7 °C) (Oral)   Resp 18   Ht 5' 3\" (1.6 m)   Wt 195 lb 6.4 oz (88.6 kg)   SpO2 99%   BMI 34.61 kg/m²       1. Have you been to the ER, urgent care clinic since your last visit? Hospitalized since your last visit? No    2. Have you seen or consulted any other health care providers outside of the [address] [place] [address] since your last visit? Include any pap smears or colon screening.  No    BMI - 34.2

## 2023-04-25 NOTE — PROGRESS NOTES
[facility] Progress Note:   F/up Physician Visit    CC: Weight Management      Mary Lou Romano is a 61 y.o. female who is here for her  f/up physician visit for the / LCD Program.  Taking 4 meal replacememts and one meal  March 196  Now 195  C/o feeling tired  She had a testosterone cream prescribed  She is post breast ca and feels uncomfortable using this testosterone compound, has not used it            Weight Metrics 4/25/2023 4/25/2023 4/4/2023 3/21/2023 3/21/2023 3/14/2023 3/13/2023   Weight - 195 lb 6.4 oz 195 lb 3.2 oz - 196 lb 3.2 oz 198 lb 195 lb   Neck Circ (inches) 12.5 - - 12.25 - - -   Waist Measure Inches 37.5 - - 39.5 - - -   Body Fat % 41.1 - - 41.2 - - -   BMI - 34.61 kg/m2 34.58 kg/m2 - 34.76 kg/m2 35.07 kg/m2 33.47 kg/m2         Outpatient Medications Marked as Taking for the 4/25/23 encounter (Office Visit) with Yolanda Marin MD   Medication Sig Dispense Refill    semaglutide (Ozempic) 0.25 mg or 0.5 mg/dose (2 mg/1.5 ml) subq pen 0.5 mg by SubCUTAneous route every seven (7) days. 1 Box 0    nystatin-triamcinolone (MYCOLOG II) topical cream Apply  to affected area as needed. meclizine (ANTIVERT) 25 mg tablet Take 1 Tablet by mouth three (3) times daily as needed for Dizziness. 20 Tablet 0    ondansetron (ZOFRAN ODT) 4 mg disintegrating tablet Take 1 Tablet by mouth every six (6) hours as needed for Nausea, Vomiting or Nausea or Vomiting. 12 Tablet 0    fluconazole (DIFLUCAN) 100 mg tablet Take 1 Tablet by mouth as needed. co-enzyme Q-10 (CO Q-10) 100 mg capsule Take 1 Capsule by mouth daily. LUTEIN PO Take 1 Tablet by mouth daily. bumetanide (BUMEX) 1 mg tablet Take 1 Tablet by mouth as needed (Leg swelling). Indications: visible water retention 90 Tablet 1    PreviDent 5000 Plus 1.1 % crea BRUSH 2 MINUTES WITH PEASIZED TOOTHPASTE AT BEDTIME AFTER REGULAR BRUSHING THEN SPIT OUT.  NOT FOOD OR DRINK AFTERWARD      gabapentin (NEURONTIN) 300 mg capsule Take 1 Capsule by mouth as needed. cholecalciferol (VITAMIN D3) 25 mcg (1,000 unit) cap Take 1 Capsule by mouth daily. pregabalin (LYRICA) 150 mg capsule Take 1 Capsule by mouth as needed. anastrozole (ARIMIDEX) 1 mg tablet Take 1 mg by mouth daily. potassium 99 mg tablet Take 1 Tablet by mouth daily. 90 Tablet 0    acetaminophen (TYLENOL ARTHRITIS PO) Take 1 Tablet by mouth two (2) times daily as needed. cyanocobalamin (VITAMIN B12) 500 mcg tablet Take 1 Tablet by mouth every morning. sucralfate (CARAFATE) 1 gram tablet Take 1 Tablet by mouth daily as needed. Prn      linaclotide (LINZESS) 290 mcg cap capsule Take 1 Capsule by mouth daily as needed. Participation   Did you attend clinic and class last week? yes    Review of Systems  Since your last visit, have you experienced any complications? no  If yes, please list:       Are you taking an appetite suppressant? Taking ozempic  If so, is there any Chest Pain, Palpitations or Dizziness? HUNGER CONTROL: cande    BP Readings from Last 3 Encounters:   04/25/23 132/84   03/21/23 118/79   03/13/23 124/70       SLEEP:6-7 , using cpap    Have you received any other medical care this week? No    If yes, where and for what? Have you discontinued or changed any medicine or dose of your medicine since your last visit with Dr Obed Cool? no  If yes, where and for what? Diet  How many ounces of calorie-free liquids did you consume each day?  60 oz    How many meal replacements did you take each day? 2 3 and a meal    Did you have any problems adhering to the program?  no If yes, please explain:      Exercise  Aerobic exercise: one day PT and gym 3 days min  Resistance exercise:  workouts / week  Any discomfort? If yes, where?       Objective  Visit Vitals  /84 (BP 1 Location: Right arm, BP Patient Position: Sitting, BP Cuff Size: Large adult)   Pulse (!) 55   Temp 98 °F (36.7 °C) (Oral)   Resp 18   Ht 5' 3\" (1.6 m)   Wt 195 lb 6.4 oz (88.6 kg)   SpO2 99%   BMI 34.61 kg/m²     No LMP recorded. Patient has had a hysterectomy. Physical Exam  Appearance: well,   Mental:A&O x 3, NAD  H:NC/AT,  EENT:   EOMI, PERRL, No scleral icterus                                        Neck: no bruit or JVD  Lung: clear, No W/R  ABD: soft, active, nontender  Ext:  no Edema  Neuro: nonfocal  Assessment / Plan    Encounter Diagnoses   Name Primary? Class 1 obesity due to excess calories with serious comorbidity in adult, unspecified BMI Yes    Insulin resistance     Hyperglycemia      Diagnoses and all orders for this visit:    1. Class 1 obesity due to excess calories with serious comorbidity in adult, unspecified BMI  Ontinue low kya diet using 3 meal replacements and a meal  Continue the exercise and try to increase the intensity as tolerated  Aim for 7-8 hours sleep  Recheck in 1 month  2. Insulin resistance  -     semaglutide (Ozempic) 0.25 mg or 0.5 mg/dose (2 mg/1.5 ml) subq pen; 0.5 mg by SubCUTAneous route every seven (7) days. 3. Hyperglycemia  -     semaglutide (Ozempic) 0.25 mg or 0.5 mg/dose (2 mg/1.5 ml) subq pen; 0.5 mg by SubCUTAneous route every seven (7) days. 1.  Weight management improved   Progress was reviewed with patient    2. Labs    Latest results reviewed with patient       face to face time with Jose Casianoment consisted of counseling & coordinating and/or discussing treatment plans in reference to her obesity The primary encounter diagnosis was Class 1 obesity due to excess calories with serious comorbidity in adult, unspecified BMI. Diagnoses of Insulin resistance and Hyperglycemia were also pertinent to this visit.

## 2023-04-27 ENCOUNTER — HOSPITAL ENCOUNTER (OUTPATIENT)
Dept: PHYSICAL THERAPY | Age: 58
Discharge: HOME | End: 2023-04-27
Payer: MEDICARE

## 2023-04-27 PROCEDURE — 97140 MANUAL THERAPY 1/> REGIONS: CPT

## 2023-05-02 ENCOUNTER — HOSPITAL ENCOUNTER (OUTPATIENT)
Dept: PHYSICAL THERAPY | Age: 58
Discharge: HOME | End: 2023-05-02
Payer: MEDICARE

## 2023-05-02 VITALS — HEIGHT: 63 IN | WEIGHT: 194.8 LBS | BODY MASS INDEX: 34.52 KG/M2

## 2023-05-02 PROCEDURE — 97535 SELF CARE MNGMENT TRAINING: CPT

## 2023-05-02 PROCEDURE — 97140 MANUAL THERAPY 1/> REGIONS: CPT
